# Patient Record
Sex: FEMALE | Race: WHITE | Employment: OTHER | URBAN - METROPOLITAN AREA
[De-identification: names, ages, dates, MRNs, and addresses within clinical notes are randomized per-mention and may not be internally consistent; named-entity substitution may affect disease eponyms.]

---

## 2018-12-25 ENCOUNTER — APPOINTMENT (OUTPATIENT)
Dept: GENERAL RADIOLOGY | Age: 63
End: 2018-12-25

## 2018-12-25 VITALS
WEIGHT: 194 LBS | RESPIRATION RATE: 16 BRPM | SYSTOLIC BLOOD PRESSURE: 162 MMHG | DIASTOLIC BLOOD PRESSURE: 69 MMHG | HEIGHT: 65 IN | BODY MASS INDEX: 32.32 KG/M2 | TEMPERATURE: 99.9 F | HEART RATE: 101 BPM | OXYGEN SATURATION: 94 %

## 2018-12-25 PROCEDURE — 71046 X-RAY EXAM CHEST 2 VIEWS: CPT

## 2018-12-26 ENCOUNTER — HOSPITAL ENCOUNTER (EMERGENCY)
Age: 63
Discharge: HOME | End: 2018-12-26

## 2018-12-26 DIAGNOSIS — J18.9 PNEUMONIA DUE TO ORGANISM: Primary | ICD-10-CM

## 2018-12-26 LAB
ALBUMIN SERPL-MCNC: 3.6 GM/DL (ref 3.4–5)
ALP BLD-CCNC: 81 IU/L (ref 40–129)
ALT SERPL-CCNC: 35 U/L (ref 10–40)
ANION GAP SERPL CALCULATED.3IONS-SCNC: 17 MMOL/L (ref 4–16)
AST SERPL-CCNC: 30 IU/L (ref 15–37)
BASOPHILS ABSOLUTE: 0 K/CU MM
BASOPHILS RELATIVE PERCENT: 0.3 % (ref 0–1)
BILIRUB SERPL-MCNC: 0.2 MG/DL (ref 0–1)
BUN BLDV-MCNC: 13 MG/DL (ref 6–23)
CALCIUM SERPL-MCNC: 8.9 MG/DL (ref 8.3–10.6)
CHLORIDE BLD-SCNC: 97 MMOL/L (ref 99–110)
CO2: 23 MMOL/L (ref 21–32)
CREAT SERPL-MCNC: 0.7 MG/DL (ref 0.6–1.1)
DIFFERENTIAL TYPE: ABNORMAL
EOSINOPHILS ABSOLUTE: 0.1 K/CU MM
EOSINOPHILS RELATIVE PERCENT: 1.3 % (ref 0–3)
GFR AFRICAN AMERICAN: >60 ML/MIN/1.73M2
GFR NON-AFRICAN AMERICAN: >60 ML/MIN/1.73M2
GLUCOSE BLD-MCNC: 137 MG/DL (ref 70–99)
HCT VFR BLD CALC: 37.1 % (ref 37–47)
HEMOGLOBIN: 11.4 GM/DL (ref 12.5–16)
IMMATURE NEUTROPHIL %: 0.5 % (ref 0–0.43)
LYMPHOCYTES ABSOLUTE: 1.1 K/CU MM
LYMPHOCYTES RELATIVE PERCENT: 12.1 % (ref 24–44)
MCH RBC QN AUTO: 27.5 PG (ref 27–31)
MCHC RBC AUTO-ENTMCNC: 30.7 % (ref 32–36)
MCV RBC AUTO: 89.6 FL (ref 78–100)
MONOCYTES ABSOLUTE: 0.9 K/CU MM
MONOCYTES RELATIVE PERCENT: 9.9 % (ref 0–4)
NUCLEATED RBC %: 0 %
PDW BLD-RTO: 12.8 % (ref 11.7–14.9)
PLATELET # BLD: 346 K/CU MM (ref 140–440)
PMV BLD AUTO: 10.1 FL (ref 7.5–11.1)
POTASSIUM SERPL-SCNC: 3.9 MMOL/L (ref 3.5–5.1)
RAPID INFLUENZA  B AGN: NEGATIVE
RAPID INFLUENZA A AGN: NEGATIVE
RBC # BLD: 4.14 M/CU MM (ref 4.2–5.4)
SEGMENTED NEUTROPHILS ABSOLUTE COUNT: 7 K/CU MM
SEGMENTED NEUTROPHILS RELATIVE PERCENT: 75.9 % (ref 36–66)
SODIUM BLD-SCNC: 137 MMOL/L (ref 135–145)
TOTAL IMMATURE NEUTOROPHIL: 0.05 K/CU MM
TOTAL NUCLEATED RBC: 0 K/CU MM
TOTAL PROTEIN: 7.5 GM/DL (ref 6.4–8.2)
WBC # BLD: 9.3 K/CU MM (ref 4–10.5)

## 2018-12-26 PROCEDURE — 80053 COMPREHEN METABOLIC PANEL: CPT

## 2018-12-26 PROCEDURE — 85025 COMPLETE CBC W/AUTO DIFF WBC: CPT

## 2018-12-26 PROCEDURE — 99283 EMERGENCY DEPT VISIT LOW MDM: CPT

## 2018-12-26 PROCEDURE — 6370000000 HC RX 637 (ALT 250 FOR IP): Performed by: PHYSICIAN ASSISTANT

## 2018-12-26 PROCEDURE — 87804 INFLUENZA ASSAY W/OPTIC: CPT

## 2018-12-26 PROCEDURE — 36415 COLL VENOUS BLD VENIPUNCTURE: CPT

## 2018-12-26 PROCEDURE — 6360000002 HC RX W HCPCS: Performed by: PHYSICIAN ASSISTANT

## 2018-12-26 PROCEDURE — 96372 THER/PROPH/DIAG INJ SC/IM: CPT

## 2018-12-26 RX ORDER — AZITHROMYCIN 250 MG/1
500 TABLET, FILM COATED ORAL ONCE
Status: COMPLETED | OUTPATIENT
Start: 2018-12-26 | End: 2018-12-26

## 2018-12-26 RX ORDER — AZITHROMYCIN 250 MG/1
250 TABLET, FILM COATED ORAL DAILY
Qty: 4 TABLET | Refills: 0 | Status: SHIPPED | OUTPATIENT
Start: 2018-12-26 | End: 2018-12-30

## 2018-12-26 RX ORDER — BENZONATATE 100 MG/1
100 CAPSULE ORAL ONCE
Status: COMPLETED | OUTPATIENT
Start: 2018-12-26 | End: 2018-12-26

## 2018-12-26 RX ORDER — CODEINE PHOSPHATE AND GUAIFENESIN 10; 100 MG/5ML; MG/5ML
5 SOLUTION ORAL EVERY 4 HOURS PRN
Status: DISCONTINUED | OUTPATIENT
Start: 2018-12-26 | End: 2018-12-26 | Stop reason: HOSPADM

## 2018-12-26 RX ORDER — GUAIFENESIN AND CODEINE PHOSPHATE 100; 10 MG/5ML; MG/5ML
10 SOLUTION ORAL 3 TIMES DAILY PRN
Qty: 100 ML | Refills: 0 | Status: SHIPPED | OUTPATIENT
Start: 2018-12-26 | End: 2019-01-02

## 2018-12-26 RX ORDER — BENZONATATE 100 MG/1
100 CAPSULE ORAL 3 TIMES DAILY PRN
Qty: 10 CAPSULE | Refills: 0 | Status: SHIPPED | OUTPATIENT
Start: 2018-12-26 | End: 2019-01-02

## 2018-12-26 RX ORDER — DEXAMETHASONE SODIUM PHOSPHATE 4 MG/ML
10 INJECTION, SOLUTION INTRA-ARTICULAR; INTRALESIONAL; INTRAMUSCULAR; INTRAVENOUS; SOFT TISSUE ONCE
Status: COMPLETED | OUTPATIENT
Start: 2018-12-26 | End: 2018-12-26

## 2018-12-26 RX ADMIN — AZITHROMYCIN 500 MG: 250 TABLET, FILM COATED ORAL at 02:03

## 2018-12-26 RX ADMIN — BENZONATATE 100 MG: 100 CAPSULE ORAL at 02:03

## 2018-12-26 RX ADMIN — DEXAMETHASONE SODIUM PHOSPHATE 10 MG: 4 INJECTION, SOLUTION INTRAMUSCULAR; INTRAVENOUS at 02:04

## 2018-12-26 RX ADMIN — GUAIFENESIN AND CODEINE PHOSPHATE 5 ML: 10; 100 LIQUID ORAL at 02:03

## 2018-12-26 NOTE — ED PROVIDER NOTES
Pt is comfortable upon disposition home. Patient is stable, Patients vital signs are stable. Vital signs and nursing notes reviewed during ED course. I independently saw patient today in the ED. All pertinent Lab data and radiographic results reviewed with patient at bedside. The patient and/or the family were informed of the results of any tests/labs/imaging, the treatment plan, and time was allotted to answer questions. See chart for details of medications given during the ED stay. Clinical Impression:  1. Pneumonia due to organism      (Please note that portions of this note may have been completed with a voice recognition program. Efforts were made to edit the dictations but occasionally words are mis-transcribed.)    Daniel Hernandez PA-C   Comment: Please note this report has been produced using speech recognition software and may contain errors related to that system including errors in grammar, punctuation, and spelling, as well as words and phrases that may be inappropriate. If there are any questions or concerns please feel free to contact the dictating provider for clarification.         Daniel Hernandez PA-C  12/26/18 [id number]

## 2020-06-11 ENCOUNTER — OFFICE VISIT (OUTPATIENT)
Dept: PRIMARY CARE CLINIC | Age: 65
End: 2020-06-11
Payer: MEDICARE

## 2020-06-11 ENCOUNTER — HOSPITAL ENCOUNTER (OUTPATIENT)
Age: 65
Setting detail: SPECIMEN
Discharge: HOME | End: 2020-06-11
Payer: MEDICARE

## 2020-06-11 VITALS — TEMPERATURE: 99.1 F

## 2020-06-11 PROCEDURE — 4040F PNEUMOC VAC/ADMIN/RCVD: CPT | Performed by: FAMILY MEDICINE

## 2020-06-11 PROCEDURE — 1036F TOBACCO NON-USER: CPT | Performed by: FAMILY MEDICINE

## 2020-06-11 PROCEDURE — 3017F COLORECTAL CA SCREEN DOC REV: CPT | Performed by: FAMILY MEDICINE

## 2020-06-11 PROCEDURE — G8421 BMI NOT CALCULATED: HCPCS | Performed by: FAMILY MEDICINE

## 2020-06-11 PROCEDURE — G8400 PT W/DXA NO RESULTS DOC: HCPCS | Performed by: FAMILY MEDICINE

## 2020-06-11 PROCEDURE — 1123F ACP DISCUSS/DSCN MKR DOCD: CPT | Performed by: FAMILY MEDICINE

## 2020-06-11 PROCEDURE — 99212 OFFICE O/P EST SF 10 MIN: CPT | Performed by: FAMILY MEDICINE

## 2020-06-11 PROCEDURE — 1090F PRES/ABSN URINE INCON ASSESS: CPT | Performed by: FAMILY MEDICINE

## 2020-06-11 PROCEDURE — G8427 DOCREV CUR MEDS BY ELIG CLIN: HCPCS | Performed by: FAMILY MEDICINE

## 2020-06-11 PROCEDURE — U0002 COVID-19 LAB TEST NON-CDC: HCPCS

## 2020-06-14 LAB
SARS-COV-2: NOT DETECTED
SOURCE: NORMAL

## 2021-01-18 ENCOUNTER — TELEPHONE (OUTPATIENT)
Dept: CARDIOLOGY CLINIC | Age: 66
End: 2021-01-18

## 2021-01-18 ENCOUNTER — OFFICE VISIT (OUTPATIENT)
Dept: FAMILY MEDICINE CLINIC | Age: 66
End: 2021-01-18
Payer: COMMERCIAL

## 2021-01-18 VITALS
RESPIRATION RATE: 16 BRPM | WEIGHT: 205 LBS | HEART RATE: 91 BPM | BODY MASS INDEX: 34.16 KG/M2 | SYSTOLIC BLOOD PRESSURE: 112 MMHG | HEIGHT: 65 IN | DIASTOLIC BLOOD PRESSURE: 70 MMHG | TEMPERATURE: 96.4 F | OXYGEN SATURATION: 96 %

## 2021-01-18 DIAGNOSIS — R06.02 SHORTNESS OF BREATH: ICD-10-CM

## 2021-01-18 DIAGNOSIS — R53.83 OTHER FATIGUE: ICD-10-CM

## 2021-01-18 DIAGNOSIS — M62.838 MUSCLE SPASM: ICD-10-CM

## 2021-01-18 DIAGNOSIS — R94.31 ABNORMAL ECG: ICD-10-CM

## 2021-01-18 DIAGNOSIS — J45.20 MILD INTERMITTENT ASTHMA WITHOUT COMPLICATION: ICD-10-CM

## 2021-01-18 DIAGNOSIS — R73.09 ELEVATED GLUCOSE: Primary | ICD-10-CM

## 2021-01-18 DIAGNOSIS — Z13.220 SCREENING, LIPID: ICD-10-CM

## 2021-01-18 DIAGNOSIS — L98.9 FACIAL LESION: ICD-10-CM

## 2021-01-18 DIAGNOSIS — R06.02 SHORTNESS OF BREATH: Primary | ICD-10-CM

## 2021-01-18 LAB
A/G RATIO: 1.8 (ref 1.1–2.2)
ALBUMIN SERPL-MCNC: 4.3 G/DL (ref 3.4–5)
ALP BLD-CCNC: 76 U/L (ref 40–129)
ALT SERPL-CCNC: 22 U/L (ref 10–40)
ANION GAP SERPL CALCULATED.3IONS-SCNC: 11 MMOL/L (ref 3–16)
AST SERPL-CCNC: 17 U/L (ref 15–37)
BASOPHILS ABSOLUTE: 0 K/UL (ref 0–0.2)
BASOPHILS RELATIVE PERCENT: 0.7 %
BILIRUB SERPL-MCNC: <0.2 MG/DL (ref 0–1)
BUN BLDV-MCNC: 13 MG/DL (ref 7–20)
CALCIUM SERPL-MCNC: 9.3 MG/DL (ref 8.3–10.6)
CHLORIDE BLD-SCNC: 100 MMOL/L (ref 99–110)
CO2: 26 MMOL/L (ref 21–32)
CREAT SERPL-MCNC: 0.7 MG/DL (ref 0.6–1.2)
EOSINOPHILS ABSOLUTE: 0.1 K/UL (ref 0–0.6)
EOSINOPHILS RELATIVE PERCENT: 1.9 %
GFR AFRICAN AMERICAN: >60
GFR NON-AFRICAN AMERICAN: >60
GLOBULIN: 2.4 G/DL
GLUCOSE BLD-MCNC: 143 MG/DL (ref 70–99)
HCT VFR BLD CALC: 37.3 % (ref 36–48)
HEMOGLOBIN: 12.2 G/DL (ref 12–16)
LYMPHOCYTES ABSOLUTE: 1.5 K/UL (ref 1–5.1)
LYMPHOCYTES RELATIVE PERCENT: 27.1 %
MAGNESIUM: 2 MG/DL (ref 1.8–2.4)
MCH RBC QN AUTO: 27.9 PG (ref 26–34)
MCHC RBC AUTO-ENTMCNC: 32.8 G/DL (ref 31–36)
MCV RBC AUTO: 85 FL (ref 80–100)
MONOCYTES ABSOLUTE: 0.5 K/UL (ref 0–1.3)
MONOCYTES RELATIVE PERCENT: 8.4 %
NEUTROPHILS ABSOLUTE: 3.4 K/UL (ref 1.7–7.7)
NEUTROPHILS RELATIVE PERCENT: 61.9 %
PDW BLD-RTO: 14.4 % (ref 12.4–15.4)
PLATELET # BLD: 255 K/UL (ref 135–450)
PMV BLD AUTO: 9.3 FL (ref 5–10.5)
POTASSIUM SERPL-SCNC: 4.4 MMOL/L (ref 3.5–5.1)
RBC # BLD: 4.38 M/UL (ref 4–5.2)
SODIUM BLD-SCNC: 137 MMOL/L (ref 136–145)
TOTAL PROTEIN: 6.7 G/DL (ref 6.4–8.2)
TSH REFLEX FT4: 3.24 UIU/ML (ref 0.27–4.2)
VITAMIN B-12: 361 PG/ML (ref 211–911)
WBC # BLD: 5.5 K/UL (ref 4–11)

## 2021-01-18 PROCEDURE — 4040F PNEUMOC VAC/ADMIN/RCVD: CPT | Performed by: PHYSICIAN ASSISTANT

## 2021-01-18 PROCEDURE — 3017F COLORECTAL CA SCREEN DOC REV: CPT | Performed by: PHYSICIAN ASSISTANT

## 2021-01-18 PROCEDURE — 3288F FALL RISK ASSESSMENT DOCD: CPT | Performed by: PHYSICIAN ASSISTANT

## 2021-01-18 PROCEDURE — G8427 DOCREV CUR MEDS BY ELIG CLIN: HCPCS | Performed by: PHYSICIAN ASSISTANT

## 2021-01-18 PROCEDURE — G8484 FLU IMMUNIZE NO ADMIN: HCPCS | Performed by: PHYSICIAN ASSISTANT

## 2021-01-18 PROCEDURE — 99203 OFFICE O/P NEW LOW 30 MIN: CPT | Performed by: PHYSICIAN ASSISTANT

## 2021-01-18 PROCEDURE — G8417 CALC BMI ABV UP PARAM F/U: HCPCS | Performed by: PHYSICIAN ASSISTANT

## 2021-01-18 PROCEDURE — 1036F TOBACCO NON-USER: CPT | Performed by: PHYSICIAN ASSISTANT

## 2021-01-18 PROCEDURE — 1090F PRES/ABSN URINE INCON ASSESS: CPT | Performed by: PHYSICIAN ASSISTANT

## 2021-01-18 PROCEDURE — G8400 PT W/DXA NO RESULTS DOC: HCPCS | Performed by: PHYSICIAN ASSISTANT

## 2021-01-18 PROCEDURE — 93000 ELECTROCARDIOGRAM COMPLETE: CPT | Performed by: PHYSICIAN ASSISTANT

## 2021-01-18 PROCEDURE — G8510 SCR DEP NEG, NO PLAN REQD: HCPCS | Performed by: PHYSICIAN ASSISTANT

## 2021-01-18 PROCEDURE — 1123F ACP DISCUSS/DSCN MKR DOCD: CPT | Performed by: PHYSICIAN ASSISTANT

## 2021-01-18 RX ORDER — ALBUTEROL SULFATE 90 UG/1
2 AEROSOL, METERED RESPIRATORY (INHALATION) EVERY 6 HOURS PRN
COMMUNITY
End: 2021-10-21 | Stop reason: SDUPTHER

## 2021-01-18 ASSESSMENT — ENCOUNTER SYMPTOMS
DIARRHEA: 0
EYES NEGATIVE: 1
VOMITING: 0
ABDOMINAL PAIN: 0
SHORTNESS OF BREATH: 1
CHEST TIGHTNESS: 0
WHEEZING: 0
COUGH: 0
NAUSEA: 0

## 2021-01-18 ASSESSMENT — PATIENT HEALTH QUESTIONNAIRE - PHQ9
SUM OF ALL RESPONSES TO PHQ QUESTIONS 1-9: 0
1. LITTLE INTEREST OR PLEASURE IN DOING THINGS: 0
SUM OF ALL RESPONSES TO PHQ QUESTIONS 1-9: 0
SUM OF ALL RESPONSES TO PHQ QUESTIONS 1-9: 0

## 2021-01-18 NOTE — TELEPHONE ENCOUNTER
Called patient to schedule Consult for SOB and abnormal EKG Offered [place] appt with Dr. Laura Lane due to address. Patient preferred to come to [facility] [place] with Dr. Merry Lubin (her  sees him). Patient scheduled for 1/27/2021 Patient voiced agreement.

## 2021-01-18 NOTE — PROGRESS NOTES
to Visit   Medication Sig Dispense Refill    albuterol sulfate  (90 Base) MCG/ACT inhaler Inhale 2 puffs into the lungs every 6 hours as needed       No current facility-administered medications on file prior to visit. Review of PMH, PSH, Family Hx, Allergies and updates made as needed. PHQ Scores 1/18/2021   PHQ2 Score 0   PHQ9 Score 0     Interpretation of Total Score Depression Severity: 1-4 = Minimal depression, 5-9 = Mild depression, 10-14 = Moderate depression, 15-19 = Moderately severe depression, 20-27 = Severe depression      No Known Allergies    History reviewed. No pertinent past medical history.     Past Surgical History:   Procedure Laterality Date    CHOLECYSTECTOMY      HYSTERECTOMY         Social History     Socioeconomic History    Marital status:      Spouse name: None    Number of children: None    Years of education: None    Highest education level: None   Occupational History    None   Social Needs    Financial resource strain: None    Food insecurity     Worry: None     Inability: None    Transportation needs     Medical: None     Non-medical: None   Tobacco Use    Smoking status: Never Smoker    Smokeless tobacco: Never Used   Substance and Sexual Activity    Alcohol use: None    Drug use: None    Sexual activity: None   Lifestyle    Physical activity     Days per week: None     Minutes per session: None    Stress: None   Relationships    Social connections     Talks on phone: None     Gets together: None     Attends Yazdanism service: None     Active member of club or organization: None     Attends meetings of clubs or organizations: None     Relationship status: None    Intimate partner violence     Fear of current or ex partner: None     Emotionally abused: None     Physically abused: None     Forced sexual activity: None   Other Topics Concern    None   Social History Narrative    None       Review of Systems   Constitutional: Negative for chills, diaphoresis, fever and unexpected weight change. HENT: Negative. Eyes: Negative. Respiratory: Positive for shortness of breath. Negative for cough, chest tightness and wheezing. Cardiovascular: Negative for chest pain, palpitations and leg swelling. Gastrointestinal: Negative for abdominal pain, diarrhea, nausea and vomiting. Endocrine: Negative for cold intolerance and heat intolerance. Genitourinary: Negative. Musculoskeletal: Positive for myalgias. Skin: Negative for rash. Lesion above right upper lip   Neurological: Negative for dizziness, light-headedness and headaches. Hematological: Negative for adenopathy. Psychiatric/Behavioral: Negative for hallucinations. The patient is not nervous/anxious. OBJECTIVE:    /70 (Site: Right Upper Arm, Position: Sitting, Cuff Size: Medium Adult)   Pulse 91   Temp 96.4 °F (35.8 °C) (Temporal)   Resp 16   Ht 5' 5\" (1.651 m)   Wt 205 lb (93 kg)   SpO2 96%   BMI 34.11 kg/m²     Physical Exam  Vitals signs reviewed. Constitutional:       Appearance: Normal appearance. HENT:      Head: Normocephalic. Right Ear: External ear normal.      Left Ear: External ear normal.      Mouth/Throat:      Lips: Lesions present. Comments: Firm slightly raised lesion right upper lip  Eyes:      Extraocular Movements: Extraocular movements intact. Neck:      Musculoskeletal: Normal range of motion and neck supple. No muscular tenderness. Thyroid: No thyroid mass, thyromegaly or thyroid tenderness. Cardiovascular:      Rate and Rhythm: Regular rhythm. Tachycardia present. Pulses: Normal pulses. Heart sounds: Normal heart sounds. Pulmonary:      Breath sounds: Normal breath sounds. Abdominal:      General: Bowel sounds are normal.      Palpations: Abdomen is soft. Musculoskeletal: Normal range of motion. Lymphadenopathy:      Cervical: No cervical adenopathy.    Skin:     General: Skin is warm and dry.   Neurological:      Mental Status: She is alert and oriented to person, place, and time. Psychiatric:         Mood and Affect: Mood normal.         ASSESSMENT/PLAN:    Problem List        Respiratory    Mild intermittent asthma without complication     Stable, f/u if increased wheezing/sob         Relevant Medications    albuterol sulfate  (90 Base) MCG/ACT inhaler       Other    Shortness of breath - Primary     Will refer to cardiology as requested by pt         Relevant Orders    EKG 12 lead (Completed)    Comprehensive Metabolic Panel    Trino Sheets MD, Cardiology, [facility]    Screening, lipid    Relevant Orders    Lipid, Fasting    Other fatigue     Labs todat         Relevant Orders    Comprehensive Metabolic Panel    CBC WITH AUTO DIFFERENTIAL    TSH WITH REFLEX TO FT4    VITAMIN D 25 HYDROXY    VITAMIN B12    Muscle spasm     Labs today         Relevant Orders    MAGNESIUM    Facial lesion     Referral to derm-Dr. Quita Anders         Relevant Orders    Amb External Referral To Dermatology    Abnormal ECG     Will refer to cardiology per pt request. As last cardiac workup was with Dr. Misha Simon and we will be unable to obtain records, referral placed today  Ekg has some minor abnormalities, no acute findings. Relevant Orders    Trino Sheets MD, Cardiology, [facility]               Return in about 4 months (around 5/18/2021) for medicare wellness.

## 2021-01-18 NOTE — ASSESSMENT & PLAN NOTE
Will refer to cardiology per pt request. As last cardiac workup was with Dr. Noah Soliz and we will be unable to obtain records, referral placed today  Ekg has some minor abnormalities, no acute findings.

## 2021-01-19 DIAGNOSIS — R73.09 ELEVATED GLUCOSE: ICD-10-CM

## 2021-01-19 LAB
ESTIMATED AVERAGE GLUCOSE: 134.1 MG/DL
HBA1C MFR BLD: 6.3 %

## 2021-01-20 ENCOUNTER — HOSPITAL ENCOUNTER (OUTPATIENT)
Age: 66
Discharge: HOME | End: 2021-01-20
Payer: MEDICARE

## 2021-01-20 LAB
CHOLESTEROL: 211 MG/DL
HDLC SERPL-MCNC: 45 MG/DL
LDL CHOLESTEROL DIRECT: 139 MG/DL
TRIGL SERPL-MCNC: 132 MG/DL
VITAMIN D 25-HYDROXY: 19.2 NG/ML

## 2021-01-20 PROCEDURE — 36415 COLL VENOUS BLD VENIPUNCTURE: CPT

## 2021-01-20 PROCEDURE — 80061 LIPID PANEL: CPT

## 2021-01-20 PROCEDURE — 83721 ASSAY OF BLOOD LIPOPROTEIN: CPT

## 2021-01-27 ENCOUNTER — PROCEDURE VISIT (OUTPATIENT)
Dept: CARDIOLOGY CLINIC | Age: 66
End: 2021-01-27
Payer: MEDICARE

## 2021-01-27 ENCOUNTER — INITIAL CONSULT (OUTPATIENT)
Dept: CARDIOLOGY CLINIC | Age: 66
End: 2021-01-27
Payer: MEDICARE

## 2021-01-27 ENCOUNTER — TELEPHONE (OUTPATIENT)
Dept: CARDIOLOGY CLINIC | Age: 66
End: 2021-01-27

## 2021-01-27 ENCOUNTER — NURSE ONLY (OUTPATIENT)
Dept: CARDIOLOGY CLINIC | Age: 66
End: 2021-01-27
Payer: MEDICARE

## 2021-01-27 VITALS
BODY MASS INDEX: 33.82 KG/M2 | HEART RATE: 76 BPM | WEIGHT: 203 LBS | HEIGHT: 65 IN | SYSTOLIC BLOOD PRESSURE: 114 MMHG | DIASTOLIC BLOOD PRESSURE: 70 MMHG

## 2021-01-27 DIAGNOSIS — R00.2 PALPITATION: ICD-10-CM

## 2021-01-27 DIAGNOSIS — E66.09 CLASS 2 OBESITY DUE TO EXCESS CALORIES WITHOUT SERIOUS COMORBIDITY WITH BODY MASS INDEX (BMI) OF 35.0 TO 35.9 IN ADULT: ICD-10-CM

## 2021-01-27 DIAGNOSIS — R06.02 SHORTNESS OF BREATH: ICD-10-CM

## 2021-01-27 DIAGNOSIS — R00.2 PALPITATIONS: Primary | ICD-10-CM

## 2021-01-27 DIAGNOSIS — R94.31 ABNORMAL ECG: ICD-10-CM

## 2021-01-27 DIAGNOSIS — R06.02 SHORTNESS OF BREATH: Primary | ICD-10-CM

## 2021-01-27 DIAGNOSIS — R00.2 PALPITATIONS: ICD-10-CM

## 2021-01-27 PROBLEM — E66.812 CLASS 2 OBESITY DUE TO EXCESS CALORIES IN ADULT: Status: ACTIVE | Noted: 2021-01-27

## 2021-01-27 LAB
LV EF: 58 %
LVEF MODALITY: NORMAL

## 2021-01-27 PROCEDURE — G8417 CALC BMI ABV UP PARAM F/U: HCPCS | Performed by: INTERNAL MEDICINE

## 2021-01-27 PROCEDURE — 93306 TTE W/DOPPLER COMPLETE: CPT | Performed by: INTERNAL MEDICINE

## 2021-01-27 PROCEDURE — 4040F PNEUMOC VAC/ADMIN/RCVD: CPT | Performed by: INTERNAL MEDICINE

## 2021-01-27 PROCEDURE — 1090F PRES/ABSN URINE INCON ASSESS: CPT | Performed by: INTERNAL MEDICINE

## 2021-01-27 PROCEDURE — 99204 OFFICE O/P NEW MOD 45 MIN: CPT | Performed by: INTERNAL MEDICINE

## 2021-01-27 PROCEDURE — 3017F COLORECTAL CA SCREEN DOC REV: CPT | Performed by: INTERNAL MEDICINE

## 2021-01-27 PROCEDURE — G8484 FLU IMMUNIZE NO ADMIN: HCPCS | Performed by: INTERNAL MEDICINE

## 2021-01-27 PROCEDURE — G8400 PT W/DXA NO RESULTS DOC: HCPCS | Performed by: INTERNAL MEDICINE

## 2021-01-27 PROCEDURE — 1123F ACP DISCUSS/DSCN MKR DOCD: CPT | Performed by: INTERNAL MEDICINE

## 2021-01-27 PROCEDURE — 1036F TOBACCO NON-USER: CPT | Performed by: INTERNAL MEDICINE

## 2021-01-27 PROCEDURE — G8427 DOCREV CUR MEDS BY ELIG CLIN: HCPCS | Performed by: INTERNAL MEDICINE

## 2021-01-27 PROCEDURE — 93000 ELECTROCARDIOGRAM COMPLETE: CPT | Performed by: INTERNAL MEDICINE

## 2021-01-27 PROCEDURE — 93015 CV STRESS TEST SUPVJ I&R: CPT | Performed by: INTERNAL MEDICINE

## 2021-01-27 NOTE — PROGRESS NOTES
72 hour holter monitor [email or web address] for palpitations and sob Serial # [id number] . Instructed patient on monitor and proper use. Instructed on diary. When to remove and bring it back. Must leave the holter monitor on  without removing for the duration of time ordered. Answered all questions the patient had. In[facility]ed [phone] with any questions or concerns with the monitor.

## 2021-01-27 NOTE — PROGRESS NOTES
CARDIAC CONSULT NOTE       Miri  72 y.o.  female    Chief Complaint   Patient presents with    Shortness of Breath    Palpitations       Referring physician:  Savannah Rubin PA-C     Primary care physician:  Savannah Rubin PA-C    History of Present Illness:     Juan Macias is a 72 y.o. female referred for evaluation and management of SOB & Palpitations. Shortness of Breath  1. When did it start for about 7 or 8 months  2. Severity NYHA CLASS 2  3. Orthopnea? No  4. PND: no  5. Associated features palpitations, which occur during activity. No C/O CHEST PAIN. No significant cardiac risk factors. has no past medical history on file. has a past surgical history that includes Cholecystectomy and Hysterectomy. reports that she has never smoked. She has never used smokeless tobacco. She reports previous alcohol use. She reports that she does not use drugs. family history includes Heart Surgery in her father and mother; Hypertension in her mother. Review of Systems:   1. Cardiovascular: No chest pain, dyspnea on exertion, palpitations or loss of consciousness  2. Respiratory: No cough or wheezing    3. Musculoskeletal:  No gait disturbance, weakness, muscle cramps, aches & pains or joint complaints  4. Neurological: No TIA or stroke symptoms  5. Psychiatric: No anxiety or depression  6. Hematologic/Lymphatic: No bleeding problems, blood clots or swollen lymph nodes    Physical Examination:    /70   Pulse 76   Ht 5' 4.5\" (1.638 m)   Wt 203 lb (92.1 kg)   BMI 34.31 kg/m²    Wt Readings from Last 3 Encounters:   01/27/21 203 lb (92.1 kg)   01/18/21 205 lb (93 kg)   12/25/18 194 lb (88 kg)     Body mass index is 34.31 kg/m². General Appearance:  Non-obese/Well Nourished  1. Skin: It is warm & dry. No rashes noted. 2. Eyes: No conjunctival Pallor seen. No jaundice noted. 3. Neck: is supple there is no elevation of JVD. No thyromegaly  4. Respiratory:  · Resp Assessment: No abnormal findings. · Resp Auscultation: Vesicular breath sounds without rales or wheezing. 5. Cardiovascular:  · Auscultation: Normal S1 & S2, No prominent murmurs  · Carotid Arteries: No bruits present  · Abdominal Aorta: Non-palpable  · Pedal Pulses: 2+ and equal   6. Abdomen:  · No masses or tenderness  · Liver/Spleen: No Abnormalities Noted, no organomegaly. 7. Musculoskeletal: No joint deformities. No muscle wasting  8. Extremities:  ·  No Cyanosis or Clubbing. No significant edema   9. Rectal / genital:  ·  Deferred  10.  Neurological/Psychiatric:  · Oriented to time, place, and person  · Non-anxious    No results found for: CKTOTAL, CKMB, CKMBINDEX, TROPONINT  BNP:  No results found for: BNP, PROBNP  PT/INR:  No results found for: PTINR  Lab Results   Component Value Date    LABA1C 6.3 01/18/2021     Lab Results   Component Value Date    CHOL 211 (H) 01/20/2021    CHOL 178 03/30/2012    TRIG 132 01/20/2021    TRIG 152 (H) 03/30/2012    HDL 45 01/20/2021    HDL 48 (L) 03/30/2012    LDLDIRECT 139 (H) 01/20/2021    LDLDIRECT 106 (H) 03/30/2012     Lab Results   Component Value Date    ALT 22 01/18/2021    ALT 35 12/26/2018    AST 17 01/18/2021    AST 30 12/26/2018     BMP:    Lab Results   Component Value Date     01/18/2021     12/26/2018    K 4.4 01/18/2021    K 3.9 12/26/2018     01/18/2021    CL 97 12/26/2018    CO2 26 01/18/2021    CO2 23 12/26/2018    BUN 13 01/18/2021    BUN 13 12/26/2018    CREATININE 0.7 01/18/2021    CREATININE 0.7 12/26/2018     CMP:    Lab Results   Component Value Date     01/18/2021     12/26/2018    K 4.4 01/18/2021    K 3.9 12/26/2018     01/18/2021    CL 97 12/26/2018    CO2 26 01/18/2021    CO2 23 12/26/2018    BUN 13 01/18/2021    BUN 13 12/26/2018    CREATININE 0.7 01/18/2021    CREATININE 0.7 12/26/2018    PROT 6.7 01/18/2021    PROT 7.5 12/26/2018    PROT 6.8 03/30/2012     TSH:    Lab Results Component Value Date    [facility] 2.871 03/30/2012       Echo: not done  Stress Cardiolyte: not done  EKG: normal sinus rhythm     QUALITY MEASURES REVIEWED:  1.CAD:Patient is taking anti platelet agent:No  Patient does not have Hx of documented CAD  2. DYSLIPIDEMIA: Patient is on cholesterol lowering medication:No   3. Beta-Blocker therapy for CAD, if prior Myocardial Infarction:No   4. Counselled regarding smoking cessation. No   Patient does not Smoke. 5.Anticoagulation therapy (for A.Fib) No   Does Not have A.Fib.   6.Discussed weight management strategies. Assessment, Recommendations & Tests:     Patient with worsening SOB & C/O Palpitations with exertion. Symptoms are fairly new. No significant Cardiac risk factors. Will check Echo, ETT & EVENT MONITOR. Office Visit for test results.      Jose Vaughan MD, 1/27/2021 8:39 AM

## 2021-02-02 ENCOUNTER — TELEPHONE (OUTPATIENT)
Dept: CARDIOLOGY CLINIC | Age: 66
End: 2021-02-02

## 2021-02-02 NOTE — TELEPHONE ENCOUNTER
LM on VM of normal results. Patient to call with any questions. Limited study due to patients body habitus. Left ventricular function is normal, EF is estimated at 55-60%. E/A reversal; indeterminate diastolic function. No regional wall motion abnormalities were detected. No significant valvular abnormalities. RVSP is 10 mmHg. No evidence of pericardial effusion. Fair exercise tolerance. Little over 5 METs work load. Baseline Hypertension with Hypertensive BP response to exercise. ETT negative for Ischemia / Arrhythmia.

## 2021-02-04 PROCEDURE — 93272 ECG/REVIEW INTERPRET ONLY: CPT | Performed by: INTERNAL MEDICINE

## 2021-02-08 ENCOUNTER — TELEPHONE (OUTPATIENT)
Dept: FAMILY MEDICINE CLINIC | Age: 66
End: 2021-02-08

## 2021-02-08 ENCOUNTER — OFFICE VISIT (OUTPATIENT)
Dept: CARDIOLOGY CLINIC | Age: 66
End: 2021-02-08
Payer: MEDICARE

## 2021-02-08 VITALS
DIASTOLIC BLOOD PRESSURE: 82 MMHG | WEIGHT: 203.8 LBS | HEART RATE: 70 BPM | BODY MASS INDEX: 34.79 KG/M2 | SYSTOLIC BLOOD PRESSURE: 128 MMHG | HEIGHT: 64 IN

## 2021-02-08 DIAGNOSIS — R94.31 ABNORMAL ECG: ICD-10-CM

## 2021-02-08 DIAGNOSIS — R06.02 SHORTNESS OF BREATH: Primary | ICD-10-CM

## 2021-02-08 DIAGNOSIS — J45.20 MILD INTERMITTENT ASTHMA WITHOUT COMPLICATION: ICD-10-CM

## 2021-02-08 DIAGNOSIS — E66.09 CLASS 2 OBESITY DUE TO EXCESS CALORIES WITHOUT SERIOUS COMORBIDITY WITH BODY MASS INDEX (BMI) OF 35.0 TO 35.9 IN ADULT: ICD-10-CM

## 2021-02-08 DIAGNOSIS — R00.2 PALPITATIONS: ICD-10-CM

## 2021-02-08 PROCEDURE — 1090F PRES/ABSN URINE INCON ASSESS: CPT | Performed by: INTERNAL MEDICINE

## 2021-02-08 PROCEDURE — G8400 PT W/DXA NO RESULTS DOC: HCPCS | Performed by: INTERNAL MEDICINE

## 2021-02-08 PROCEDURE — G8417 CALC BMI ABV UP PARAM F/U: HCPCS | Performed by: INTERNAL MEDICINE

## 2021-02-08 PROCEDURE — 4040F PNEUMOC VAC/ADMIN/RCVD: CPT | Performed by: INTERNAL MEDICINE

## 2021-02-08 PROCEDURE — G8484 FLU IMMUNIZE NO ADMIN: HCPCS | Performed by: INTERNAL MEDICINE

## 2021-02-08 PROCEDURE — 99213 OFFICE O/P EST LOW 20 MIN: CPT | Performed by: INTERNAL MEDICINE

## 2021-02-08 PROCEDURE — 1123F ACP DISCUSS/DSCN MKR DOCD: CPT | Performed by: INTERNAL MEDICINE

## 2021-02-08 PROCEDURE — G8427 DOCREV CUR MEDS BY ELIG CLIN: HCPCS | Performed by: INTERNAL MEDICINE

## 2021-02-08 PROCEDURE — 3017F COLORECTAL CA SCREEN DOC REV: CPT | Performed by: INTERNAL MEDICINE

## 2021-02-08 PROCEDURE — 1036F TOBACCO NON-USER: CPT | Performed by: INTERNAL MEDICINE

## 2021-02-08 NOTE — PATIENT INSTRUCTIONS
Primary / Secondary prevention is the goal by aggressive risk modification, healthy and therapeutic life style changes for cardiovascular risk reduction. Various goals are discussed and multiple questions answered. CAD:No  HTN:No  CARDIOMYOPATHY:No  CONGESTIVE HEART FAILURE:No     VHD:no   No significant VHD noted  OTHER RELEVANT DIAGNOSIS:as noted in patient's active problem list:  Obesity:  TESTS ORDERED:   None this visit  All previously ordered tests reviewed. MEDICATIONS: CPM     Office f/u in one year.

## 2021-02-08 NOTE — LETTER
[facility]  [address] [address]  Phone: [phone]  Fax: [phone]    Bobby Kulkarni MD        February 8, 2021     [place] [id number]    Patient: Jessica Rodgers  MR Number: [id number]  YOB: 1955  Date of Visit: 2/8/2021    Dear Dr. Don Romero: Thank you for the request for consultation for Merrill Rodriguez to me for the evaluation of SOB. Below are the relevant portions of my assessment and plan of care. If you have questions, please do not hesitate to call me. I look forward to following [place] along with you.     Sincerely,        Bobby Kulkarni MD

## 2021-02-08 NOTE — TELEPHONE ENCOUNTER
Patient is calling about [employer] meal replacement shakes. She wanted to be sure it was okay with her restricted diet of low-fat, low-carb foods. Please advise.

## 2021-02-08 NOTE — PROGRESS NOTES
OFFICE PROGRESS NOTES      Clemente Chong is a 72 y.o. female who has    CHIEF COMPLAINT AS FOLLOWS:  CHEST PAIN: Patient denies any C/O chest pains at this time. SOB: Has SOB with exertion but no change over previous noted. LEG EDEMA: No leg edema   PALPITATIONS: Denies any C/O Palpitations   DIZZINESS: No C/O Dizziness   SYNCOPE: None   OTHER:                                     HPI: Patient is here for F/U on her SOB & Palpitations problems. She does not have any new complaints at this time. Current Outpatient Medications   Medication Sig Dispense Refill    VITAMIN D PO Take by mouth daily      Red Yeast Rice Extract (RED YEAST RICE PO) Take by mouth      albuterol sulfate  (90 Base) MCG/ACT inhaler Inhale 2 puffs into the lungs every 6 hours as needed       No current facility-administered medications for this visit. Allergies: Patient has no known allergies. Review of Systems:    Constitutional: Negative for diaphoresis and fatigue  Respiratory: Negative for shortness of breath  Cardiovascular: Negative for chest pain, dyspnea on exertion, claudication, edema, irregular heartbeat, murmur, palpitations or shortness of breath  Musculoskeletal: Negative for muscle pain, muscular weakness, negative for pain in arm and leg or swelling in foot and leg    Objective:  /82   Pulse 70   Ht 5' 4\" (1.626 m)   Wt 203 lb 12.8 oz (92.4 kg)   BMI 34.98 kg/m²   Wt Readings from Last 3 Encounters:   02/08/21 203 lb 12.8 oz (92.4 kg)   01/27/21 203 lb (92.1 kg)   01/18/21 205 lb (93 kg)     Body mass index is 34.98 kg/m². GENERAL - Alert, oriented, pleasant, in no apparent distress. EYES: No jaundice, no conjunctival pallor. Neck - Supple. No jugular venous distention noted. No carotid bruits. Cardiovascular - Normal S1 and S2 without obvious murmur or gallop. Extremities - No cyanosis, clubbing, or significant edema. Pulmonary - No respiratory distress.   No wheezes or rales. Lab Review   No results found for: TROPONINT  No results found for: BNP, PROBNP  No results found for: INR  Lab Results   Component Value Date    LABA1C 6.3 01/18/2021     Lab Results   Component Value Date    WBC 5.5 01/18/2021    WBC 9.3 12/26/2018    HCT 37.3 01/18/2021    HCT 37.1 12/26/2018    MCV 85.0 01/18/2021    MCV 89.6 12/26/2018     01/18/2021     12/26/2018     Lab Results   Component Value Date    CHOL 211 (H) 01/20/2021    CHOL 178 03/30/2012    TRIG 132 01/20/2021    TRIG 152 (H) 03/30/2012    HDL 45 01/20/2021    HDL 48 (L) 03/30/2012    LDLDIRECT 139 (H) 01/20/2021    LDLDIRECT 106 (H) 03/30/2012     Lab Results   Component Value Date    ALT 22 01/18/2021    ALT 35 12/26/2018    AST 17 01/18/2021    AST 30 12/26/2018     BMP:    Lab Results   Component Value Date     01/18/2021     12/26/2018    K 4.4 01/18/2021    K 3.9 12/26/2018     01/18/2021    CL 97 12/26/2018    CO2 26 01/18/2021    CO2 23 12/26/2018    BUN 13 01/18/2021    BUN 13 12/26/2018    CREATININE 0.7 01/18/2021    CREATININE 0.7 12/26/2018     CMP:   Lab Results   Component Value Date     01/18/2021     12/26/2018    K 4.4 01/18/2021    K 3.9 12/26/2018     01/18/2021    CL 97 12/26/2018    CO2 26 01/18/2021    CO2 23 12/26/2018    BUN 13 01/18/2021    BUN 13 12/26/2018    CREATININE 0.7 01/18/2021    CREATININE 0.7 12/26/2018    PROT 6.7 01/18/2021    PROT 7.5 12/26/2018    PROT 6.8 03/30/2012     Lab Results   Component Value Date    [facility] 2.871 03/30/2012     ETT 1/2021:   Fair exercise tolerance. Little over 5 METs work load. Baseline Hypertension with Hypertensive BP response to exercise. ETT negative for Ischemia / Arrhythmia. ECHO 1/2021   Limited study due to patients body habitus. Left ventricular function is normal, EF is estimated at 55-60%. E/A reversal; indeterminate diastolic function.    No regional wall motion abnormalities were

## 2021-02-17 PROBLEM — Z13.220 SCREENING, LIPID: Status: RESOLVED | Noted: 2021-01-18 | Resolved: 2021-02-17

## 2021-03-16 PROBLEM — E55.9 VITAMIN D DEFICIENCY: Status: ACTIVE | Noted: 2021-03-16

## 2021-05-20 ENCOUNTER — OFFICE VISIT (OUTPATIENT)
Dept: FAMILY MEDICINE CLINIC | Age: 66
End: 2021-05-20
Payer: MEDICARE

## 2021-05-20 VITALS
HEART RATE: 77 BPM | SYSTOLIC BLOOD PRESSURE: 120 MMHG | WEIGHT: 198.8 LBS | BODY MASS INDEX: 34.12 KG/M2 | TEMPERATURE: 97.8 F | RESPIRATION RATE: 16 BRPM | OXYGEN SATURATION: 96 % | DIASTOLIC BLOOD PRESSURE: 70 MMHG

## 2021-05-20 DIAGNOSIS — Z78.0 ASYMPTOMATIC MENOPAUSAL STATE: ICD-10-CM

## 2021-05-20 DIAGNOSIS — Z12.11 COLON CANCER SCREENING: ICD-10-CM

## 2021-05-20 DIAGNOSIS — Z00.00 ROUTINE GENERAL MEDICAL EXAMINATION AT A HEALTH CARE FACILITY: Primary | ICD-10-CM

## 2021-05-20 DIAGNOSIS — Z71.89 ACP (ADVANCE CARE PLANNING): ICD-10-CM

## 2021-05-20 DIAGNOSIS — Z12.31 ENCOUNTER FOR SCREENING MAMMOGRAM FOR BREAST CANCER: ICD-10-CM

## 2021-05-20 PROCEDURE — 1123F ACP DISCUSS/DSCN MKR DOCD: CPT | Performed by: PHYSICIAN ASSISTANT

## 2021-05-20 PROCEDURE — G0438 PPPS, INITIAL VISIT: HCPCS | Performed by: PHYSICIAN ASSISTANT

## 2021-05-20 PROCEDURE — 4040F PNEUMOC VAC/ADMIN/RCVD: CPT | Performed by: PHYSICIAN ASSISTANT

## 2021-05-20 PROCEDURE — 3017F COLORECTAL CA SCREEN DOC REV: CPT | Performed by: PHYSICIAN ASSISTANT

## 2021-05-20 ASSESSMENT — PATIENT HEALTH QUESTIONNAIRE - PHQ9
SUM OF ALL RESPONSES TO PHQ QUESTIONS 1-9: 0
SUM OF ALL RESPONSES TO PHQ9 QUESTIONS 1 & 2: 0
1. LITTLE INTEREST OR PLEASURE IN DOING THINGS: 0
SUM OF ALL RESPONSES TO PHQ QUESTIONS 1-9: 0
2. FEELING DOWN, DEPRESSED OR HOPELESS: 0
2. FEELING DOWN, DEPRESSED OR HOPELESS: 0
1. LITTLE INTEREST OR PLEASURE IN DOING THINGS: 0
SUM OF ALL RESPONSES TO PHQ9 QUESTIONS 1 & 2: 0

## 2021-05-20 ASSESSMENT — LIFESTYLE VARIABLES
HOW OFTEN DO YOU HAVE A DRINK CONTAINING ALCOHOL: NEVER
HOW OFTEN DO YOU HAVE A DRINK CONTAINING ALCOHOL: 0
AUDIT TOTAL SCORE: INCOMPLETE
AUDIT-C TOTAL SCORE: INCOMPLETE

## 2021-05-20 NOTE — PROGRESS NOTES
Medicare Annual Wellness Visit  Name: Eric Mclaughlin Date: 2021   MRN: [id number] Sex: Female   Age: 77 y.o. Ethnicity: Non-/Non    : 1955 Race: Shweta Wood is here for Annual Exam (medicare)    Screenings for behavioral, psychosocial and functional/safety risks, and cognitive dysfunction are all negative except as indicated below. These results, as well as other patient data from the [address] form, are documented in Flowsheets linked to this Encounter. No Known Allergies      Prior to Visit Medications    Medication Sig Taking?  Authorizing Provider   VITAMIN D PO Take by mouth daily Yes Historical Provider, MD   Red Yeast Rice Extract (RED YEAST RICE PO) Take by mouth Yes Historical Provider, MD   albuterol sulfate  (90 Base) MCG/ACT inhaler Inhale 2 puffs into the lungs every 6 hours as needed Yes Historical Provider, MD         Past Medical History:   Diagnosis Date    Basal cell carcinoma 04/15/2021    History of echocardiogram 2021    EF 55-60%, E/A reversal indterminate diastolic function, no significant valvular abnormalities, no pericardial effusion    History of exercise stress test 2021    Treadmill, normal       Past Surgical History:   Procedure Laterality Date    CHOLECYSTECTOMY      HYSTERECTOMY           Family History   Problem Relation Age of Onset    Heart Surgery Mother     Hypertension Mother     Heart Surgery Father        CareTeam (Including outside providers/suppliers regularly involved in providing care):   Patient Care Team:  Carmenza Alarcon PA-C as PCP - General (Physician Assistant)  Carmenza Alarcon PA-C as PCP [facility]    Wt Readings from Last 3 Encounters:   21 198 lb 12.8 oz (90.2 kg)   21 203 lb 12.8 oz (92.4 kg)   21 203 lb (92.1 kg)     Vitals:    21 0824   BP: 120/70   Site: Right Upper Arm   Position: Sitting   Cuff Size: Large Adult   Pulse: 77   Resp: 16   Temp: 97.8 °F (36.6 °C)   TempSrc: Temporal   SpO2: 96%   Weight: 198 lb 12.8 oz (90.2 kg)     Body mass index is 34.12 kg/m². Based upon direct observation of the patient, evaluation of cognition reveals recent and remote memory intact. General Appearance: alert and oriented to person, place and time, well-developed and well-nourished, in no acute distress  Pulmonary/Chest: clear to auscultation bilaterally- no wheezes, rales or rhonchi, normal air movement, no respiratory distress  Cardiovascular: normal rate, normal S1 and S2, no gallops, intact distal pulses and no carotid bruits  Abdomen: soft, non-tender, non-distended, normal bowel sounds, no masses or organomegaly  Extremities: no cyanosis and no clubbing  Musculoskeletal: normal range of motion, no joint swelling, deformity or tenderness  Neurologic: gait and coordination normal and speech normal    Patient's complete Health Risk Assessment and screening values have been reviewed and are found in Flowsheets. The following problems were reviewed today and where indicated follow up appointments were made and/or referrals ordered. Positive Risk Factor Screenings with Interventions:            General Health and ACP:  General  In general, how would you say your health is?: Good  In the past 7 days, have you experienced any of the following?  New or Increased Pain, New or Increased Fatigue, Loneliness, Social Isolation, Stress or Anger?: None of These  Do you get the social and emotional support that you need?: Yes  Do you have a Living Will?: (!) No  Advance Directives     Power of [address] [place] [address] Will ACP-Advance Directive ACP-Power of     Not on File Not on File Not on File Not on File      General Health Risk Interventions:  · No Living Will: Advance Care Planning addressed with patient today    Health Habits/Nutrition:  Health Habits/Nutrition  Do you exercise for at least 20 minutes 2-3 times per week?: (!) No  Have you lost any weight without trying in the past 3 months?: No  Do you eat only one meal per day?: No  Have you seen the dentist within the past year?: (!) No     Health Habits/Nutrition Interventions:  · Inadequate physical activity:  patient is not ready to increase his/her physical activity level at this time  · Dental exam overdue:  patient encouraged to make appointment with his/her dentist    Hearing/Vision:  No exam data present  Hearing/Vision  Do you or your family notice any trouble with your hearing that hasn't been managed with hearing aids?: No  Do you have difficulty driving, watching TV, or doing any of your daily activities because of your eyesight?: No  Have you had an eye exam within the past year?: (!) No  Hearing/Vision Interventions:  · Vision concerns:  patient encouraged to make appointment with his/her eye specialist      Personalized Preventive Plan   Current Health Maintenance Status  Immunization History   Administered Date(s) Administered    Tdap (Boostrix, Adacel) 01/01/2016        Health Maintenance   Topic Date Due    Hepatitis C screen  Never done    COVID-19 Vaccine (1) Never done    Shingles Vaccine (1 of 2) Never done    Colon cancer screen colonoscopy  Never done    DEXA (modify frequency per FRAX score)  Never done    Breast cancer screen  03/30/2014    Pneumococcal 65+ years Vaccine (1 of 1 - PPSV23) Never done   [employer] Visit (AWV)  Never done    Flu vaccine (Season Ended) 09/01/2021    A1C test (Diabetic or Prediabetic)  01/18/2022    DTaP/Tdap/Td vaccine (2 - Td) 01/01/2026    Lipid screen  01/20/2026    Hepatitis A vaccine  Aged Out    Hepatitis B vaccine  Aged Out    Hib vaccine  Aged Out    Meningococcal (ACWY) vaccine  Aged Out     Recommendations for Remember The Member Due: see orders and patient instructions/AVS.  .   Recommended screening schedule for the next 5-10 years is provided to the patient in written form: see Patient Instructions/AVS.    Kunal Dave was seen today for annual exam.    Diagnoses and all orders for this visit:    Colon cancer screening  -     Cologuard (For External Results Only); Future    ACP (advance care planning)    Asymptomatic menopausal state  -     DEXA Bone Density Axial Skeleton; Future    Encounter for screening mammogram for breast cancer  -     NICOLE Digital Screen Bilateral [[id number]]; Future    Routine general medical examination at a health care facility                 Cardiovascular Disease Risk Counseling: Assessed the patient's risk to develop cardiovascular disease and reviewed main risk factors. Reviewed steps to reduce disease risk including:   · Quitting tobacco use, reducing amount smoked, or not starting the habit  · Making healthy food choices  · Being physically active and gradualy increasing activity levels   · Reduce weight and determine a healthy BMI goal  · Monitor blood pressure and treat if higher than 140/90 mmHg  · Maintain blood total cholesterol levels under 5 mmol/l or 190 mg/dl  · Maintain LDL cholesterol levels under 3.0 mmol/l or 115 mg/dl   · Control blood glucose levels  · Consider taking aspirin (75 mg daily), once blood pressure is controlled   Provided a follow up plan. Time spent (minutes): 5    Advance Care Planning   Advanced Care Planning: Discussed the patients choices for care and treatment in case of a health event that adversely affects decision-making abilities. Also discussed the patients long-term treatment options. Reviewed with the patient the [address] [place] [address] of [address] [place] [address] Declaration forms  Reviewed the process of designating a competent adult as an Agent (or -in-fact) that could take make health care decisions for the patient if incompetent. Patient was asked to complete the declaration forms, either acknowledge the forms by a public notary or an eligible witness and provide a signed copy to the practice office.   Time spent (minutes): 5

## 2021-05-20 NOTE — PATIENT INSTRUCTIONS
Advance Directives: Care Instructions  Overview  An advance directive is a legal way to state your wishes at the end of your life. It tells your family and your doctor what to do if you can't say what you want. There are two main types of advance directives. You can change them any time your wishes change. Living will. This form tells your family and your doctor your wishes about life support and other treatment. The form is also called a declaration. Medical power of . This form lets you name a person to make treatment decisions for you when you can't speak for yourself. This person is called a health care agent (health care proxy, health care surrogate). The form is also called a durable power of  for health care. If you do not have an advance directive, decisions about your medical care may be made by a family member, or by a doctor or a  who doesn't know you. It may help to think of an advance directive as a gift to the people who care for you. If you have one, they won't have to make tough decisions by themselves. Follow-up care is a key part of your treatment and safety. Be sure to make and go to all appointments, and call your doctor if you are having problems. It's also a good idea to know your test results and keep a list of the medicines you take. What should you include in an advance directive? Many states have a unique advance directive form. (It may ask you to address specific issues.) Or you might use a universal form that's approved by many states. If your form doesn't tell you what to address, it may be hard to know what to include in your advance directive. Use the questions below to help you get started. · Who do you want to make decisions about your medical care if you are not able to? · What life-support measures do you want if you have a serious illness that gets worse over time or can't be cured? · What are you most afraid of that might happen? (Maybe you're afraid of having pain, losing your independence, or being kept alive by machines.)  · Where would you prefer to die? (Your home? A hospital? A nursing home?)  · Do you want to donate your organs when you die? · Do you want certain Buddhist practices performed before you die? When should you call for help? Be sure to contact your doctor if you have any questions. Where can you learn more? Go to https://[email or web address].[email or web address] org and sign in to your [employer] account. Enter R264 in the [employer] box to learn more about \"Advance Directives: Care Instructions. \"     If you do not have an account, please click on the \"Sign Up Now\" link. Current as of: July 17, 2020               Content Version: 12.8  © 2006-2021 Healthwise, [employer]. Care instructions adapted under license by [facility] ([facility]). If you have questions about a medical condition or this instruction, always ask your healthcare professional. [place] [address] any warranty or liability for your use of this information. Learning About Medical Power of   What is a medical power of ? A medical power of , also called a durable power of  for health care, is one type of the legal forms called advance directives. It lets you name the person you want to make treatment decisions for you if you can't speak or decide for yourself. The person you choose is called your health care agent. This person is also called a health care proxy or health care surrogate. A medical power of  may be called something else in your state. How do you choose a health care agent? Choose your health care agent carefully. This person may or may not be a family member. Talk to the person before you make your final decision. Make sure he or she is comfortable with this responsibility. It's a good idea to choose someone who:  · Is at least 25years old.   · Knows you well and understands what makes life meaningful for you. · Understands your Sikhism and moral values. · Will do what you want, not what he or she wants. · Will be able to make difficult choices at a stressful time. · Will be able to refuse or stop treatment, if that is what you would want, even if you could die. · Will be firm and confident with health professionals if needed. · Will ask questions to get needed information. · Lives near you or agrees to travel to you if needed. Your family may help you make medical decisions while you can still be part of that process. But it's important to choose one person to be your health care agent in case you aren't able to make decisions for yourself. If you don't fill out the legal form and name a health care agent, the decisions your family can make may be limited. A health care agent may be called something else in your state. Who will make decisions for you if you don't have a health care agent? If you don't have a health care agent or a living will, you may not get the care you want. Decisions may be made by family members who disagree about your medical care. Or decisions may be made by a medical professional who doesn't know you well. In some cases, a  makes the decisions. When you name a health care agent, it is very clear who has the power to make health decisions for you. How do you name a health care agent? You name your health care agent on a legal form. This form is usually called a medical power of . Ask your hospital, state bar association, or office on aging where to find these forms. You must sign the form to make it legal. Some states require you to get the form notarized. This means that a person called a  watches you sign the form and then he or she signs the form. Some states also require that two or more witnesses sign the form. Be sure to tell your family members and doctors who your health care agent is.   Where can you learn more? Go to https://chpepiceweb.[place] [employer] [email or web address] org and sign in to your [employer] account. Enter [id number] in the [facility] box to learn more about \"Learning About [address]. \"     If you do not have an account, please click on the \"Sign Up Now\" link. Current as of: July 17, 2020               Content Version: 12.8  © 2006-2021 WebMD. Care instructions adapted under license by [facility] ([facility]). If you have questions about a medical condition or this instruction, always ask your healthcare professional. [address] any warranty or liability for your use of this information. Learning About Josesito Jessica  What is a living will? A living will, also called a declaration, is a legal form. It tells your family and your doctor your wishes when you can't speak for yourself. It's used by the health professionals who will treat you as you near the end of your life or if you get seriously hurt or ill. If you put your wishes in writing, your loved ones and others will know what kind of care you want. They won't need to guess. This can ease your mind and be helpful to others. And you can change or cancel your living will at any time. A living will is not the same as an estate or property will. An estate will explains what you want to happen with your money and property after you die. How do you use it? A living will is used to describe the kinds of treatment or life support you want as you near the end of your life or if you get seriously hurt or ill. Keep these facts in mind about living tran. · Your living will is used only if you can't speak or make decisions for yourself. Most often, one or more doctors must certify that you can't speak or decide for yourself before your living will takes effect. · If you get better and can speak for yourself again, you can accept or refuse any treatment.  It doesn't matter what you said in your Learning About Healthy Weight  What is a healthy weight? A healthy weight is the weight at which you feel good about yourself and have energy for work and play. It's also one that lowers your risk for health problems. What can you do to stay at a healthy weight? It can be hard to stay at a healthy weight, especially when fast food, vending-machine snacks, and processed foods are so easy to find. And with your busy lifestyle, activity may be low on your list of things to do. But staying at a healthy weight may be easier than you think. Here are some dos and don'ts for staying at a healthy weight. Do eat healthy foods  The kinds of foods you eat have a big impact on both your weight and your health. Reaching and staying at a healthy weight is not about going on a diet. It's about making healthier food choices every day and changing your diet for good. Healthy eating means eating a variety of foods so that you get all the nutrients you need. Your body needs protein, carbohydrate, and fats for energy. They keep your heart beating, your brain active, and your muscles working. On most days, try to eat from each food group. This means eating a variety of:  · Whole grains, such as whole wheat breads and pastas. · Fruits and vegetables. · Dairy products, such as low-fat milk, yogurt, and cheese. · Lean proteins, such as all types of fish, chicken without the skin, and beans. Don't have too much or too little of one thing. All foods, if eaten in moderation, can be part of healthy eating. Even sweets can be okay. If your favorite foods are high in fat, salt, sugar, or calories, limit how often you eat them. Eat smaller servings, or look for healthy substitutes. Do watch what you eat  Many people eat more than their bodies need. Part of staying at a healthy weight means learning how much food you really need from day to day and not eating more than that.  Even with healthy foods, eating too much can make you gain weight. Having a well-balanced diet means that you eat enough, but not too much, and that your food gives you the nutrients you need to stay healthy. So listen to your body. Eat when you're hungry. Stop when you feel satisfied. It's a good idea to have healthy snacks ready for when you get hungry. Keep healthy snacks with you at work, in your car, and at home. If you have a healthy snack easily available, you'll be less likely to pick a candy bar or bag of chips from a vending machine instead. Some healthy snacks you might want to keep on hand are fruit, low-fat yogurt, string cheese, low-fat microwave popcorn, raisins and other dried fruit, nuts, whole wheat crackers, pretzels, carrots, celery sticks, and broccoli. Do some physical activity  A big part of reaching and staying at a healthy weight is being active. When you're active, you burn calories. This makes it easier to reach and stay at a healthy weight. When you're active on a regular basis, your body burns more calories, even when you're at rest. Being active helps you lose fat and build lean muscle. Try to be active for at least 1 hour every day. This may sound like a lot, but it's okay to be active in smaller blocks of time that add up to 1 hour a day. Any activity that makes your heart beat faster and keeps it there for a while counts. A brisk walk, run, or swim will get your heart beating faster. So will climbing stairs, shooting baskets, or cycling. Even some household chores like vacuuming and mowing the lawn will get your heart rate up. Pick activities that you enjoy--ones that make your heart beat faster, your muscles stronger, and your muscles and joints more flexible. If you find more than one thing you like doing, do them all. You don't have to do the same thing every day. Don't diet  Diets don't work. Diets are temporary. Because you give up so much when you diet, you may be hungry and think about food all the time.  And after you ask your healthcare professional. [place] [address] any warranty or liability for your use of this information. Personalized Preventive Plan for Glee Kussmaul - 5/20/2021  Medicare offers a range of preventive health benefits. Some of the tests and screenings are paid in full while other may be subject to a deductible, co-insurance, and/or copay. Some of these benefits include a comprehensive review of your medical history including lifestyle, illnesses that may run in your family, and various assessments and screenings as appropriate. After reviewing your medical record and screening and assessments performed today your provider may have ordered immunizations, labs, imaging, and/or referrals for you. A list of these orders (if applicable) as well as your Preventive Care list are included within your After Visit Summary for your review. Other Preventive Recommendations:    · A preventive eye exam performed by an eye specialist is recommended every 1-2 years to screen for glaucoma; cataracts, macular degeneration, and other eye disorders. · A preventive dental visit is recommended every 6 months. · Try to get at least 150 minutes of exercise per week or 10,000 steps per day on a pedometer . · Order or download the FREE \"Exercise & Physical Activity: Your Everyday Guide\" from [employer]. Call 7-159.674.1942 or search [employer] online. · You need 5655-3657 mg of calcium and 7290-7439 IU of vitamin D per day. It is possible to meet your calcium requirement with diet alone, but a vitamin D supplement is usually necessary to meet this goal.  · When exposed to the sun, use a sunscreen that protects against both UVA and UVB radiation with an SPF of 30 or greater. Reapply every 2 to 3 hours or after sweating, drying off with a towel, or swimming. · Always wear a seat belt when traveling in a car.  Always wear a helmet when riding a bicycle or motorcycle.

## 2021-06-02 DIAGNOSIS — Z12.11 COLON CANCER SCREENING: ICD-10-CM

## 2021-06-24 ENCOUNTER — TELEPHONE (OUTPATIENT)
Dept: FAMILY MEDICINE CLINIC | Age: 66
End: 2021-06-24

## 2021-06-24 NOTE — TELEPHONE ENCOUNTER
Called and left message for patient to call the office. Patient has called to get the results of her cologuard results is negative.

## 2021-07-14 ENCOUNTER — HOSPITAL ENCOUNTER (OUTPATIENT)
Dept: WOMENS IMAGING | Age: 66
Discharge: HOME | End: 2021-07-14
Payer: MEDICARE

## 2021-07-14 DIAGNOSIS — Z12.31 ENCOUNTER FOR SCREENING MAMMOGRAM FOR BREAST CANCER: ICD-10-CM

## 2021-07-14 DIAGNOSIS — Z78.0 ASYMPTOMATIC MENOPAUSAL STATE: ICD-10-CM

## 2021-07-14 PROCEDURE — 77080 DXA BONE DENSITY AXIAL: CPT

## 2021-07-14 PROCEDURE — 77063 BREAST TOMOSYNTHESIS BI: CPT

## 2021-07-22 ENCOUNTER — TELEPHONE (OUTPATIENT)
Dept: FAMILY MEDICINE CLINIC | Age: 66
End: 2021-07-22

## 2021-07-22 RX ORDER — ALENDRONATE SODIUM 70 MG/1
70 TABLET ORAL
Qty: 4 TABLET | Refills: 5 | Status: SHIPPED | OUTPATIENT
Start: 2021-07-22 | End: 2021-10-21

## 2021-07-22 NOTE — TELEPHONE ENCOUNTER
Spoke with patient mammogram and bone density test results given patient would like to start the once a week fosamax advised of the recommendations of the medication.  Patient verbalized understanding

## 2021-10-21 ENCOUNTER — OFFICE VISIT (OUTPATIENT)
Dept: FAMILY MEDICINE CLINIC | Age: 66
End: 2021-10-21
Payer: MEDICARE

## 2021-10-21 VITALS
SYSTOLIC BLOOD PRESSURE: 110 MMHG | OXYGEN SATURATION: 93 % | TEMPERATURE: 97 F | DIASTOLIC BLOOD PRESSURE: 70 MMHG | BODY MASS INDEX: 35.6 KG/M2 | RESPIRATION RATE: 16 BRPM | HEART RATE: 70 BPM | WEIGHT: 207.4 LBS

## 2021-10-21 DIAGNOSIS — E11.9 TYPE 2 DIABETES MELLITUS WITHOUT COMPLICATION, WITHOUT LONG-TERM CURRENT USE OF INSULIN (HCC): Primary | ICD-10-CM

## 2021-10-21 DIAGNOSIS — M81.0 AGE-RELATED OSTEOPOROSIS WITHOUT CURRENT PATHOLOGICAL FRACTURE: ICD-10-CM

## 2021-10-21 DIAGNOSIS — E78.2 MIXED HYPERLIPIDEMIA: ICD-10-CM

## 2021-10-21 DIAGNOSIS — J45.20 MILD INTERMITTENT ASTHMA WITHOUT COMPLICATION: ICD-10-CM

## 2021-10-21 DIAGNOSIS — E55.9 VITAMIN D DEFICIENCY: ICD-10-CM

## 2021-10-21 PROBLEM — M62.838 MUSCLE SPASM: Status: RESOLVED | Noted: 2021-01-18 | Resolved: 2021-10-21

## 2021-10-21 PROBLEM — R73.03 PREDIABETES: Status: ACTIVE | Noted: 2021-10-21

## 2021-10-21 PROBLEM — R06.02 SHORTNESS OF BREATH: Status: RESOLVED | Noted: 2021-01-18 | Resolved: 2021-10-21

## 2021-10-21 LAB — HBA1C MFR BLD: 6.6 %

## 2021-10-21 PROCEDURE — 99214 OFFICE O/P EST MOD 30 MIN: CPT | Performed by: PHYSICIAN ASSISTANT

## 2021-10-21 PROCEDURE — 2022F DILAT RTA XM EVC RTNOPTHY: CPT | Performed by: PHYSICIAN ASSISTANT

## 2021-10-21 PROCEDURE — 1036F TOBACCO NON-USER: CPT | Performed by: PHYSICIAN ASSISTANT

## 2021-10-21 PROCEDURE — G8484 FLU IMMUNIZE NO ADMIN: HCPCS | Performed by: PHYSICIAN ASSISTANT

## 2021-10-21 PROCEDURE — 3044F HG A1C LEVEL LT 7.0%: CPT | Performed by: PHYSICIAN ASSISTANT

## 2021-10-21 PROCEDURE — G8417 CALC BMI ABV UP PARAM F/U: HCPCS | Performed by: PHYSICIAN ASSISTANT

## 2021-10-21 PROCEDURE — 3017F COLORECTAL CA SCREEN DOC REV: CPT | Performed by: PHYSICIAN ASSISTANT

## 2021-10-21 PROCEDURE — 1123F ACP DISCUSS/DSCN MKR DOCD: CPT | Performed by: PHYSICIAN ASSISTANT

## 2021-10-21 PROCEDURE — G8427 DOCREV CUR MEDS BY ELIG CLIN: HCPCS | Performed by: PHYSICIAN ASSISTANT

## 2021-10-21 PROCEDURE — 4040F PNEUMOC VAC/ADMIN/RCVD: CPT | Performed by: PHYSICIAN ASSISTANT

## 2021-10-21 PROCEDURE — G8399 PT W/DXA RESULTS DOCUMENT: HCPCS | Performed by: PHYSICIAN ASSISTANT

## 2021-10-21 PROCEDURE — 1090F PRES/ABSN URINE INCON ASSESS: CPT | Performed by: PHYSICIAN ASSISTANT

## 2021-10-21 PROCEDURE — 83036 HEMOGLOBIN GLYCOSYLATED A1C: CPT | Performed by: PHYSICIAN ASSISTANT

## 2021-10-21 RX ORDER — ALBUTEROL SULFATE 90 UG/1
2 AEROSOL, METERED RESPIRATORY (INHALATION) EVERY 6 HOURS PRN
Qty: 1 G | Refills: 5 | Status: SHIPPED | OUTPATIENT
Start: 2021-10-21

## 2021-10-21 RX ORDER — IBANDRONATE SODIUM 150 MG/1
150 TABLET, FILM COATED ORAL
Qty: 1 TABLET | Refills: 11 | Status: SHIPPED | OUTPATIENT
Start: 2021-10-21 | End: 2022-07-14

## 2021-10-21 ASSESSMENT — PATIENT HEALTH QUESTIONNAIRE - PHQ9
SUM OF ALL RESPONSES TO PHQ QUESTIONS 1-9: 0
2. FEELING DOWN, DEPRESSED OR HOPELESS: 0
SUM OF ALL RESPONSES TO PHQ9 QUESTIONS 1 & 2: 0
1. LITTLE INTEREST OR PLEASURE IN DOING THINGS: 0

## 2021-10-21 ASSESSMENT — ENCOUNTER SYMPTOMS
WHEEZING: 1
GASTROINTESTINAL NEGATIVE: 1

## 2021-10-21 NOTE — PROGRESS NOTES
[facility]  1955  77 y.o.  female    SUBJECTIVE:    Chief Complaint   Patient presents with    Follow-up     patient is here for 5 month follow up     Flu Vaccine     declines       HPI   Pt here for routine f/u. Pt states she is doing well overall, has some concerns with weight. Does not routinely exercise ( asthma and reports wheezing when exercising). Does not follow any particular diet restrictions. Vit d deficiency-vit D 19.2 in January of this year. Bought vit d supplement but has not taken. Dexa scan positive for osteoporosis but pt unable to tolerate fosamax due to diarrhea. Prediabetes-A1C 6.3 in January of this year. Denies polyuria/polydipsia/polyphagia at this time. Hyperlipidemia-pt advised to start red yeast rice, has not initiated therapy at this time. Not routinely exercising or following low cholesterol diet. PHQ Scores 10/21/2021 5/20/2021 5/20/2021 1/18/2021   PHQ2 Score 0 0 0 0   PHQ9 Score 0 0 0 0     Interpretation of Total Score Depression Severity: 1-4 = Minimal depression, 5-9 = Mild depression, 10-14 = Moderate depression, 15-19 = Moderately severe depression, 20-27 = Severe depression     Current Outpatient Medications on File Prior to Visit   Medication Sig Dispense Refill    VITAMIN D PO Take by mouth daily (Patient not taking: Reported on 10/21/2021)      Red Yeast Rice Extract (RED YEAST RICE PO) Take by mouth (Patient not taking: Reported on 10/21/2021)       No current facility-administered medications on file prior to visit.        No Known Allergies    Past Medical History:   Diagnosis Date    Basal cell carcinoma 04/15/2021    History of echocardiogram 01/27/2021    EF 55-60%, E/A reversal indterminate diastolic function, no significant valvular abnormalities, no pericardial effusion    History of exercise stress test 01/27/2021    Treadmill, normal    Muscle spasm 1/18/2021    Shortness of breath 1/18/2021       Past Surgical History: Procedure Laterality Date    CHOLECYSTECTOMY      HYSTERECTOMY      OVARY REMOVAL      age 39       Social History     Socioeconomic History    Marital status:      Spouse name: None    Number of children: None    Years of education: None    Highest education level: None   Occupational History    None   Tobacco Use    Smoking status: Never Smoker    Smokeless tobacco: Never Used   Vaping Use    Vaping Use: Never assessed   Substance and Sexual Activity    Alcohol use: Not Currently     Alcohol/week: 0.0 standard drinks    Drug use: Never    Sexual activity: Yes     Partners: Male   Other Topics Concern    None   Social History Narrative    None     Social Determinants of Health     Financial Resource Strain:     Difficulty of Paying Living Expenses:    Food Insecurity:     Worried About Running Out of Food in the Last Year:     Ran Out of Food in the Last Year:    Transportation Needs:     Lack of Transportation (Medical):  Lack of Transportation (Non-Medical):    Physical Activity:     Days of Exercise per Week:     Minutes of Exercise per Session:    Stress:     Feeling of Stress :    Social Connections:     Frequency of Communication with Friends and Family:     Frequency of Social Gatherings with Friends and Family:     Attends Protestant Services:     Active Member of Clubs or Organizations:     Attends Club or Organization Meetings:     Marital Status:    Intimate Partner Violence:     Fear of Current or Ex-Partner:     Emotionally Abused:     Physically Abused:     Sexually Abused:        Review of Systems   Constitutional: Negative for chills and fever. Respiratory: Positive for wheezing. With exercise only   Cardiovascular: Negative. Gastrointestinal: Negative. Endocrine: Negative for polydipsia, polyphagia and polyuria.        OBJECTIVE:    /70 (Site: Left Upper Arm, Position: Sitting, Cuff Size: Large Adult)   Pulse 70   Temp 97 °F (36.1 °C) (Temporal)   Resp 16   Wt 207 lb 6.4 oz (94.1 kg)   SpO2 93%   BMI 35.60 kg/m²     Physical Exam  Vitals reviewed. Constitutional:       General: She is not in acute distress. Appearance: Normal appearance. HENT:      Head: Normocephalic. Right Ear: External ear normal.      Left Ear: External ear normal.   Eyes:      Extraocular Movements: Extraocular movements intact. Cardiovascular:      Rate and Rhythm: Normal rate and regular rhythm. Heart sounds: Normal heart sounds. Pulmonary:      Effort: Pulmonary effort is normal.      Breath sounds: Normal breath sounds. Musculoskeletal:         General: Normal range of motion. Skin:     General: Skin is warm and dry. Neurological:      Mental Status: She is alert and oriented to person, place, and time. Ken Agosto:    Problem List        Musculoskeletal and Integument    Age-related osteoporosis without current pathological fracture     Pt will try boniva as she was unable to tolerate fosamax due to diarrhea          Relevant Medications    ibandronate (BONIVA) 150 MG tablet       Respiratory    Mild intermittent asthma without complication     Refill albuterol, use prior to exercising and then prn          Relevant Medications    albuterol sulfate  (90 Base) MCG/ACT inhaler       Other    Vitamin D deficiency     Recheck labs now          Relevant Orders    VITAMIN D 25 HYDROXY    Prediabetes - Primary     A1C today now 6.6. Discussed at length lifestyle changes to manage disease. Pt voices understanding and will recheck in 3 months for A1C          Mixed hyperlipidemia     Will recheck labs today. Did not start red yeast rice          Relevant Orders    Lipid, Fasting    Comprehensive Metabolic Panel               Return in about 3 months (around 1/21/2022).

## 2021-10-21 NOTE — ASSESSMENT & PLAN NOTE
A1C today now 6.6. Discussed at length lifestyle changes to manage disease.  Pt voices understanding and will recheck in 3 months for A1C

## 2021-10-22 LAB
A/G RATIO: 1.7 (ref 1.1–2.2)
ALBUMIN SERPL-MCNC: 4.3 G/DL (ref 3.4–5)
ALP BLD-CCNC: 83 U/L (ref 40–129)
ALT SERPL-CCNC: 21 U/L (ref 10–40)
ANION GAP SERPL CALCULATED.3IONS-SCNC: 14 MMOL/L (ref 3–16)
AST SERPL-CCNC: 20 U/L (ref 15–37)
BILIRUB SERPL-MCNC: 0.3 MG/DL (ref 0–1)
BUN BLDV-MCNC: 15 MG/DL (ref 7–20)
CALCIUM SERPL-MCNC: 9.1 MG/DL (ref 8.3–10.6)
CHLORIDE BLD-SCNC: 104 MMOL/L (ref 99–110)
CHOLESTEROL, FASTING: 206 MG/DL (ref 0–199)
CO2: 22 MMOL/L (ref 21–32)
CREAT SERPL-MCNC: 0.6 MG/DL (ref 0.6–1.2)
GFR AFRICAN AMERICAN: >60
GFR NON-AFRICAN AMERICAN: >60
GLOBULIN: 2.6 G/DL
GLUCOSE BLD-MCNC: 142 MG/DL (ref 70–99)
HDLC SERPL-MCNC: 47 MG/DL (ref 40–60)
LDL CHOLESTEROL CALCULATED: 127 MG/DL
POTASSIUM SERPL-SCNC: 4.6 MMOL/L (ref 3.5–5.1)
SODIUM BLD-SCNC: 140 MMOL/L (ref 136–145)
TOTAL PROTEIN: 6.9 G/DL (ref 6.4–8.2)
TRIGLYCERIDE, FASTING: 160 MG/DL (ref 0–150)
VITAMIN D 25-HYDROXY: 16.4 NG/ML
VLDLC SERPL CALC-MCNC: 32 MG/DL

## 2022-03-01 ENCOUNTER — HOSPITAL ENCOUNTER (OUTPATIENT)
Age: 67
Discharge: HOME | End: 2022-03-01
Payer: MEDICARE

## 2022-03-01 ENCOUNTER — OFFICE VISIT (OUTPATIENT)
Dept: FAMILY MEDICINE CLINIC | Age: 67
End: 2022-03-01
Payer: MEDICARE

## 2022-03-01 ENCOUNTER — HOSPITAL ENCOUNTER (OUTPATIENT)
Dept: GENERAL RADIOLOGY | Age: 67
Discharge: HOME | End: 2022-03-01
Payer: MEDICARE

## 2022-03-01 VITALS
OXYGEN SATURATION: 97 % | RESPIRATION RATE: 16 BRPM | TEMPERATURE: 98 F | HEART RATE: 77 BPM | WEIGHT: 205.8 LBS | DIASTOLIC BLOOD PRESSURE: 78 MMHG | BODY MASS INDEX: 35.33 KG/M2 | SYSTOLIC BLOOD PRESSURE: 124 MMHG

## 2022-03-01 DIAGNOSIS — M79.672 PAIN OF LEFT HEEL: ICD-10-CM

## 2022-03-01 DIAGNOSIS — M81.0 AGE-RELATED OSTEOPOROSIS WITHOUT CURRENT PATHOLOGICAL FRACTURE: ICD-10-CM

## 2022-03-01 DIAGNOSIS — E55.9 VITAMIN D DEFICIENCY: ICD-10-CM

## 2022-03-01 DIAGNOSIS — R73.03 PREDIABETES: Primary | ICD-10-CM

## 2022-03-01 DIAGNOSIS — E78.2 MIXED HYPERLIPIDEMIA: ICD-10-CM

## 2022-03-01 LAB — HBA1C MFR BLD: 6.3 %

## 2022-03-01 PROCEDURE — G8417 CALC BMI ABV UP PARAM F/U: HCPCS | Performed by: PHYSICIAN ASSISTANT

## 2022-03-01 PROCEDURE — 4040F PNEUMOC VAC/ADMIN/RCVD: CPT | Performed by: PHYSICIAN ASSISTANT

## 2022-03-01 PROCEDURE — G8484 FLU IMMUNIZE NO ADMIN: HCPCS | Performed by: PHYSICIAN ASSISTANT

## 2022-03-01 PROCEDURE — 83036 HEMOGLOBIN GLYCOSYLATED A1C: CPT | Performed by: PHYSICIAN ASSISTANT

## 2022-03-01 PROCEDURE — 1123F ACP DISCUSS/DSCN MKR DOCD: CPT | Performed by: PHYSICIAN ASSISTANT

## 2022-03-01 PROCEDURE — 3017F COLORECTAL CA SCREEN DOC REV: CPT | Performed by: PHYSICIAN ASSISTANT

## 2022-03-01 PROCEDURE — 1090F PRES/ABSN URINE INCON ASSESS: CPT | Performed by: PHYSICIAN ASSISTANT

## 2022-03-01 PROCEDURE — 1036F TOBACCO NON-USER: CPT | Performed by: PHYSICIAN ASSISTANT

## 2022-03-01 PROCEDURE — 73630 X-RAY EXAM OF FOOT: CPT

## 2022-03-01 PROCEDURE — G8427 DOCREV CUR MEDS BY ELIG CLIN: HCPCS | Performed by: PHYSICIAN ASSISTANT

## 2022-03-01 PROCEDURE — 99214 OFFICE O/P EST MOD 30 MIN: CPT | Performed by: PHYSICIAN ASSISTANT

## 2022-03-01 PROCEDURE — G8399 PT W/DXA RESULTS DOCUMENT: HCPCS | Performed by: PHYSICIAN ASSISTANT

## 2022-03-01 ASSESSMENT — PATIENT HEALTH QUESTIONNAIRE - PHQ9
SUM OF ALL RESPONSES TO PHQ QUESTIONS 1-9: 0
2. FEELING DOWN, DEPRESSED OR HOPELESS: 0
SUM OF ALL RESPONSES TO PHQ QUESTIONS 1-9: 0
SUM OF ALL RESPONSES TO PHQ9 QUESTIONS 1 & 2: 0
1. LITTLE INTEREST OR PLEASURE IN DOING THINGS: 0

## 2022-03-01 NOTE — PROGRESS NOTES
Lety Pena  1955  77 y.o.  female    SUBJECTIVE:    Chief Complaint   Patient presents with    Other     Pt here for f/u    Mass     Reports painful, red bump on back of heel x 1 month. Painful to walk    Diabetes       HPI  DM- A1C last visit 6.6 in October 2021. Pt states life has been very stressful over  past few months, uncle passed away ang pt in charge of estate-states he was a hoarder and she has had to work on getting home cleared/ready for sale. States she has not followed diet closely but has been more active. Vit d deficiency- 19.2 last January, advised to start supplement but pt did not, repeated lab despite not taking and level had dropped to 16.4. Pt still not taking supplements routinely. She does have osteoporosis (per Dexa 7/14/2021) and was prescribed boniva but is also not taking this medication     hyperlidemia-sporadic use of red yeast rice, not following low chol diet at this time    The 10-year ASCVD risk score (Julia Hair., et al., 2013) is: 6.3%    Values used to calculate the score:      Age: 77 years      Sex: Female      Is Non- : No      Diabetic: No      Tobacco smoker: No      Systolic Blood Pressure: 405 mmHg      Is BP treated: No      HDL Cholesterol: 47 mg/dL      Total Cholesterol: 206 mg/dL     Vit d deficiency- not taking supplement    Left heel pain-one month, no injury but pain did start after wearing heavy pair of shoes and walking around mall. She states she has large knot on back of heel that is tender/painful at times.        PHQ Scores 3/1/2022 10/21/2021 5/20/2021 5/20/2021 1/18/2021   PHQ2 Score 0 0 0 0 0   PHQ9 Score 0 0 0 0 0     Interpretation of Total Score Depression Severity: 1-4 = Minimal depression, 5-9 = Mild depression, 10-14 = Moderate depression, 15-19 = Moderately severe depression, 20-27 = Severe depression     Current Outpatient Medications on File Prior to Visit   Medication Sig Dispense Refill    Multiple Vitamins-Minerals (VITEYES AREDS FORMULA PO) Take by mouth      albuterol sulfate  (90 Base) MCG/ACT inhaler Inhale 2 puffs into the lungs every 6 hours as needed for Wheezing 1 g 5    VITAMIN D PO Take by mouth daily       Red Yeast Rice Extract (RED YEAST RICE PO) Take by mouth       ibandronate (BONIVA) 150 MG tablet Take 1 tablet by mouth every 30 days Take one (1) tablet once per month in the morning with a full glass of water, on an empty stomach, and do not take anything else by mouth or lie down for the next 30 minutes. (Patient not taking: Reported on 3/1/2022) 1 tablet 11     No current facility-administered medications on file prior to visit.        No Known Allergies    Past Medical History:   Diagnosis Date    Basal cell carcinoma 04/15/2021    History of echocardiogram 01/27/2021    EF 55-60%, E/A reversal indterminate diastolic function, no significant valvular abnormalities, no pericardial effusion    History of exercise stress test 01/27/2021    Treadmill, normal    Muscle spasm 1/18/2021    Shortness of breath 1/18/2021       Past Surgical History:   Procedure Laterality Date    CHOLECYSTECTOMY      HYSTERECTOMY      OVARY REMOVAL      age 39       Social History     Socioeconomic History    Marital status:      Spouse name: None    Number of children: None    Years of education: None    Highest education level: None   Occupational History    None   Tobacco Use    Smoking status: Never Smoker    Smokeless tobacco: Never Used   Vaping Use    Vaping Use: None   Substance and Sexual Activity    Alcohol use: Not Currently     Alcohol/week: 0.0 standard drinks    Drug use: Never    Sexual activity: Yes     Partners: Male   Other Topics Concern    None   Social History Narrative    None     Social Determinants of Health     Financial Resource Strain:     Difficulty of Paying Living Expenses: Not on file   Food Insecurity:     Worried About Running Out of Food in the Last Year: Not on file    Ran Out of Food in the Last Year: Not on file   Transportation Needs:     Lack of Transportation (Medical): Not on file    Lack of Transportation (Non-Medical): Not on file   Physical Activity:     Days of Exercise per Week: Not on file    Minutes of Exercise per Session: Not on file   Stress:     Feeling of Stress : Not on file   Social Connections:     Frequency of Communication with Friends and Family: Not on file    Frequency of Social Gatherings with Friends and Family: Not on file    Attends Hinduism Services: Not on file    Active Member of [address] [place] [address] [employer] or Organizations: Not on file    Attends Club or Organization Meetings: Not on file    Marital Status: Not on file   Intimate Partner Violence:     Fear of Current or Ex-Partner: Not on file    Emotionally Abused: Not on file    Physically Abused: Not on file    Sexually Abused: Not on file   Housing Stability:     Unable to Pay for Housing in the Last Year: Not on file    Number of [place] in the Last Year: Not on file    Unstable Housing in the Last Year: Not on file       Review of Systems   Constitutional: Negative for chills and fever. Respiratory: Negative. Cardiovascular: Negative. Gastrointestinal: Negative. Endocrine: Negative for polydipsia, polyphagia and polyuria. Musculoskeletal: Positive for arthralgias. Neurological: Negative. OBJECTIVE:    /78 (Site: Left Upper Arm, Position: Sitting, Cuff Size: Large Adult)   Pulse 77   Temp 98 °F (36.7 °C)   Resp 16   Wt 205 lb 12.8 oz (93.4 kg)   SpO2 97%   BMI 35.33 kg/m²     Physical Exam  Vitals reviewed. Constitutional:       General: She is not in acute distress. Appearance: Normal appearance. HENT:      Head: Normocephalic. Right Ear: External ear normal.      Left Ear: External ear normal.   Cardiovascular:      Rate and Rhythm: Normal rate and regular rhythm. Heart sounds: Normal heart sounds. Pulmonary:      Effort: Pulmonary effort is normal.      Breath sounds: Normal breath sounds. Musculoskeletal:      Cervical back: Normal range of motion and neck supple. Feet:    Feet:      Comments: Nodule noted posterior left heel, tender to palpation  Skin:     General: Skin is warm and dry. Neurological:      Mental Status: She is alert and oriented to person, place, and time. Bhavesh Suarez:    Problem List        Musculoskeletal and Integument    Age-related osteoporosis without current pathological fracture     Due for dexa next year, pt has not started medication. Aware of risks/benefits/SE          Relevant Medications    ibandronate (BONIVA) 150 MG tablet       Other    Vitamin D deficiency     Encouraged to start supplementation          Prediabetes - Primary     The patient is asked to make an attempt to improve diet and exercise patterns to aid in medical management of this problem. Relevant Orders    POCT glycosylated hemoglobin (Hb A1C) (Completed)    Pain of left heel    Relevant Orders    XR FOOT LEFT (MIN 3 VIEWS) (Completed)    Mixed hyperlipidemia     Encouraged to start red yeast rice, follow low chol diet, increase execise                     Return in about 3 months (around 5/23/2022) for medicare wellness.

## 2022-03-04 DIAGNOSIS — M77.32 CALCANEAL SPUR OF LEFT FOOT: Primary | ICD-10-CM

## 2022-03-04 PROBLEM — M79.672 PAIN OF LEFT HEEL: Status: ACTIVE | Noted: 2022-03-04

## 2022-03-04 ASSESSMENT — ENCOUNTER SYMPTOMS
GASTROINTESTINAL NEGATIVE: 1
RESPIRATORY NEGATIVE: 1

## 2022-05-11 ENCOUNTER — HOSPITAL ENCOUNTER (EMERGENCY)
Age: 67
Discharge: HOME | End: 2022-05-11
Attending: EMERGENCY MEDICINE
Payer: MEDICARE

## 2022-05-11 ENCOUNTER — APPOINTMENT (OUTPATIENT)
Dept: GENERAL RADIOLOGY | Age: 67
End: 2022-05-11
Payer: MEDICARE

## 2022-05-11 VITALS
TEMPERATURE: 97.1 F | BODY MASS INDEX: 34.99 KG/M2 | WEIGHT: 210 LBS | HEIGHT: 65 IN | HEART RATE: 90 BPM | SYSTOLIC BLOOD PRESSURE: 178 MMHG | DIASTOLIC BLOOD PRESSURE: 73 MMHG | OXYGEN SATURATION: 94 % | RESPIRATION RATE: 18 BRPM

## 2022-05-11 DIAGNOSIS — J40 BRONCHITIS: Primary | ICD-10-CM

## 2022-05-11 PROCEDURE — 6360000002 HC RX W HCPCS: Performed by: EMERGENCY MEDICINE

## 2022-05-11 PROCEDURE — 99283 EMERGENCY DEPT VISIT LOW MDM: CPT

## 2022-05-11 PROCEDURE — 71046 X-RAY EXAM CHEST 2 VIEWS: CPT

## 2022-05-11 PROCEDURE — 6370000000 HC RX 637 (ALT 250 FOR IP): Performed by: EMERGENCY MEDICINE

## 2022-05-11 RX ORDER — ALBUTEROL SULFATE 2.5 MG/3ML
2.5 SOLUTION RESPIRATORY (INHALATION) ONCE
Status: COMPLETED | OUTPATIENT
Start: 2022-05-11 | End: 2022-05-11

## 2022-05-11 RX ORDER — GUAIFENESIN AND DEXTROMETHORPHAN HYDROBROMIDE 100; 10 MG/5ML; MG/5ML
5 SOLUTION ORAL EVERY 4 HOURS PRN
Qty: 120 ML | Refills: 0 | Status: SHIPPED | OUTPATIENT
Start: 2022-05-11 | End: 2022-07-14

## 2022-05-11 RX ORDER — PREDNISONE 50 MG/1
50 TABLET ORAL DAILY
Qty: 5 TABLET | Refills: 0 | Status: SHIPPED | OUTPATIENT
Start: 2022-05-11 | End: 2022-05-16

## 2022-05-11 RX ADMIN — PREDNISONE 50 MG: 20 TABLET ORAL at 15:19

## 2022-05-11 RX ADMIN — ALBUTEROL SULFATE 2.5 MG: 2.5 SOLUTION RESPIRATORY (INHALATION) at 15:20

## 2022-05-11 ASSESSMENT — PAIN - FUNCTIONAL ASSESSMENT: PAIN_FUNCTIONAL_ASSESSMENT: NONE - DENIES PAIN

## 2022-05-11 NOTE — ED PROVIDER NOTES
EMERGENCY DEPARTMENT ENCOUNTER    Patient: Basil Shen  MRN: [id number]  : 1955  Date of Evaluation: 2022  ED Provider:  Sabrina Wolff MD    CHIEF COMPLAINT  Chief Complaint   Patient presents with    Cough    Wheezing       HPI  Cleo Baltazar Smoker is a 79 y.o. female who presents with complaints of nasal congestion, runny nose, productive cough and wheezing for the last 4 days. Symptoms mostly symptoms are mostly constant but worsened at nighttime. She has tried Lucía Jurgen and some over-the-counter cold medications without much improvement. Does have an albuterol inhaler at home which does help with the wheezing. Denies any other associated symptoms or complaints or concerns.       REVIEW OF SYSTEMS    Constitutional: negative for fever, chills  Neurological: negative for HA, focal weakness, loss of sensation  Ophthalmic: negative for vision change  ENT: negative for sore throat  Cardiovascular: negative for chest pain, palpitations, edema  Respiratory: as in HPI  GI: negative for abdominal pain, nausea, vomiting, diarrhea, constipation  : negative for dysuria, hematuria  Musculoskeletal: negative for myalgias, decreased ROM, joint swelling  Dermatological: negative for rash, wounds  Heme: Negative for bleeding, bruising      PAST MEDICAL HISTORY  Past Medical History:   Diagnosis Date    Asthma     Basal cell carcinoma 04/15/2021    History of echocardiogram 2021    EF 55-60%, E/A reversal indterminate diastolic function, no significant valvular abnormalities, no pericardial effusion    History of exercise stress test 2021    Treadmill, normal    Muscle spasm 2021    Shortness of breath 2021       CURRENT MEDICATIONS  [unfilled]    ALLERGIES  No Known Allergies    SURGICAL HISTORY  Past Surgical History:   Procedure Laterality Date    CHOLECYSTECTOMY      HYSTERECTOMY      OVARY REMOVAL      age 39       FAMILY HISTORY  Family History   Problem Relation Age of Onset    Heart Surgery Mother     Hypertension Mother     Heart Surgery Father        SOCIAL HISTORY  Social History     Socioeconomic History    Marital status:      Spouse name: None    Number of children: None    Years of education: None    Highest education level: None   Occupational History    None   Tobacco Use    Smoking status: Never Smoker    Smokeless tobacco: Never Used   Vaping Use    Vaping Use: None   Substance and Sexual Activity    Alcohol use: Not Currently     Alcohol/week: 0.0 standard drinks    Drug use: Never    Sexual activity: Yes     Partners: Male   Other Topics Concern    None   Social History Narrative    None     Social Determinants of Health     Financial Resource Strain:     Difficulty of Paying Living Expenses: Not on file   Food Insecurity:     Worried About Running Out of Food in the Last Year: Not on file    Jyothi of Food in the Last Year: Not on file   Transportation Needs:     Lack of Transportation (Medical): Not on file    Lack of Transportation (Non-Medical):  Not on file   Physical Activity:     Days of Exercise per Week: Not on file    Minutes of Exercise per Session: Not on file   Stress:     Feeling of Stress : Not on file   Social Connections:     Frequency of Communication with Friends and Family: Not on file    Frequency of Social Gatherings with Friends and Family: Not on file    Attends Mormonism Services: Not on file    Active Member of [address] [place] [address] or Organizations: Not on file    Attends Club or Organization Meetings: Not on file    Marital Status: Not on file   Intimate Partner Violence:     Fear of Current or Ex-Partner: Not on file    Emotionally Abused: Not on file    Physically Abused: Not on file    Sexually Abused: Not on file   Housing Stability:     Unable to Pay for Housing in the Last Year: Not on file    Number of [place] in the Last Year: Not on file    Unstable Housing in the Last Year: Not on file         **Past medical, family and social histories, and nursing notes reviewed and verified by me**      PHYSICAL EXAM  VITAL SIGNS:   ED Triage Vitals   Enc Vitals Group      BP       Pulse       Resp       Temp       Temp src       SpO2       Weight       Height       Head Circumference       Peak Flow       Pain Score       Pain Loc       Pain Edu? Excl. in [address]? Vitals during ED course were reviewed and are as charted. Constitutional: Minimal distress, Non-toxic appearance  Eyes: Conjunctiva normal, No discharge  HENT: Normocephalic, Atraumatic, posterior oropharynx is nonerythematous and without exudate, uvula is midline, oropharynx moist  Neck: Supple, no stridor, no grossly visible or palpable masses  Cardiovascular: Regular rate and rhythm, No murmurs, No rubs, No gallops, no JVD  Pulmonary/Chest: End expiratory wheezing, otherwise, No respiratory distress or accessory muscle use, No crackles or rhonchi. Abdomen: Soft, nondistended and nonrigid, No tenderness or peritoneal signs, No masses, normal bowel sounds  Back: No midline point tenderness, No paraspinous muscle tenderness. No CVA tenderness  Extremities: No gross deformities, no edema, no tenderness  Neurologic: Normal motor function, Normal sensory function, No focal deficits  Skin: Warm, Dry, No erythema, No rash, No cyanosis, No mottling  Lymphatic: No lymphadenopathy in the following location(s): cervical  Psychiatric: Alert and oriented x3, Affect normal        RADIOLOGY/PROCEDURES/LABS/MEDICATIONS ADMINISTERED:    I have reviewed and interpreted all of the currently available lab results from this visit (if applicable):  No results found for this visit on 05/11/22. ABNORMAL LABS:  Labs Reviewed - No data to display      IMAGING STUDIES ORDERED:  XR CHEST (2 VW)    I have personally viewed the imaging studies.  The radiologist interpretation is:   XR CHEST (2 VW)   Final Result   Mild central bronchial thickening which may be related to reactive airway   disease without focal suspicious infiltrate. MEDICATIONS ADMINISTERED:  Medications   predniSONE (DELTASONE) tablet 50 mg (50 mg Oral Given 5/11/22 1519)   albuterol (PROVENTIL) nebulizer solution 2.5 mg (2.5 mg Nebulization Given 5/11/22 1520)         COURSE & MEDICAL DECISION MAKING  Last vitals: BP (!) 178/73   Pulse 90   Temp 97.1 °F (36.2 °C) (Skin)   Resp 18   Ht 5' 5\" (1.651 m)   Wt 210 lb (95.3 kg)   SpO2 94%   BMI 34.95 kg/m²     42-year-old female with symptoms consistent with bronchitis. Likely viral in etiology. Not in any respiratory distress and satting well on room air. No radiographic evidence for pneumonia. Will treat with course of steroids. Differential diagnoses considered included, but were not limited to, acute bronchospasm, allergic reaction/anaphylaxis, acute respiratory infection, pneumonia, acute coronary syndrome,  congestive heart failure, non-CHF-related pulmonary edema, pneumothorax. Additional workup and treatment in the ED as documented above. Patient reassured and will be discharged home. I have explained to the patient in appropriate terminology our work-up in the ED and their diagnosis. I have also given anticipatory guidance and expectant management of their condition as an outpatient as per my custom. The patient was given clear discharge and follow-up instructions including return to the ER immediately for worsening concerns. The patient has been advised to follow-up with their primary care physician and/or referred physician in the next two to three days or sooner if worsening and to return to the ER immediately as above with any concerns. I provided the patient counseling with regard to my customary list of strict return precautions as well as return precautions specific to the cause for today's emergency department visit.  The patient will return under these provided conditions, but should also return for new concerns or further worsening. Pt and/or family understand and agree with plan. Clinical Impression:  1. Bronchitis        Disposition referral (if applicable):  Familia Metzger PA-C  [address] [facility]  [address] [place] [address]  [phone]    Schedule an appointment as soon as possible for a visit       [address]  [phone]    If symptoms worsen      Disposition medications (if applicable):  New Prescriptions    DEXTROMETHORPHAN-GUAIFENESIN (TUSSIN DM)  MG/5ML SYRP    Take 5 mLs by mouth every 4 hours as needed for Cough    PREDNISONE (DELTASONE) 50 MG TABLET    Take 1 tablet by mouth daily for 5 days       ED Provider Disposition Time  DISPOSITION Decision To Discharge 05/11/2022 03:50:54 PM          Electronically signed by: Danial Cazares M.D., 5/11/2022 3:56 PM      This dictation was created with voice recognition software. While attempts have been made to review the dictation as it is transcribed, on occasion the spoken word can be misinterpreted by the technology leading to omissions or inappropriate words, phrases or sentences.         Lukasz Aragon MD  05/11/22 [id number]

## 2022-05-11 NOTE — ED NOTES
The patient presents to the er today with complaints of a cough and wheezing for the past 4 days. She reports of a history of asthma and uses an inhaler. She does have a non productive cough during triage.         Evangelist Steinberg RN  05/11/22 [id number]

## 2022-05-11 NOTE — ED NOTES
The patient ambulated to X ray and back with the tech.                         Ron Johns RN  05/11/22 3134

## 2022-05-11 NOTE — ED NOTES
Discharge instructions and prescriptions were reviewed and the patient will follow up with the PCP. The patient voiced understanding of these instructions.                 Harsh Pathak RN  05/11/22 530

## 2022-05-17 ENCOUNTER — OFFICE VISIT (OUTPATIENT)
Dept: INTERNAL MEDICINE CLINIC | Age: 67
End: 2022-05-17
Payer: MEDICARE

## 2022-05-17 VITALS
WEIGHT: 210 LBS | HEART RATE: 87 BPM | BODY MASS INDEX: 34.99 KG/M2 | SYSTOLIC BLOOD PRESSURE: 140 MMHG | OXYGEN SATURATION: 97 % | HEIGHT: 65 IN | DIASTOLIC BLOOD PRESSURE: 76 MMHG

## 2022-05-17 DIAGNOSIS — J40 BRONCHITIS: Primary | ICD-10-CM

## 2022-05-17 PROCEDURE — 1123F ACP DISCUSS/DSCN MKR DOCD: CPT | Performed by: NURSE PRACTITIONER

## 2022-05-17 PROCEDURE — 4040F PNEUMOC VAC/ADMIN/RCVD: CPT | Performed by: NURSE PRACTITIONER

## 2022-05-17 PROCEDURE — G8417 CALC BMI ABV UP PARAM F/U: HCPCS | Performed by: NURSE PRACTITIONER

## 2022-05-17 PROCEDURE — G8399 PT W/DXA RESULTS DOCUMENT: HCPCS | Performed by: NURSE PRACTITIONER

## 2022-05-17 PROCEDURE — G8427 DOCREV CUR MEDS BY ELIG CLIN: HCPCS | Performed by: NURSE PRACTITIONER

## 2022-05-17 PROCEDURE — 3017F COLORECTAL CA SCREEN DOC REV: CPT | Performed by: NURSE PRACTITIONER

## 2022-05-17 PROCEDURE — 99213 OFFICE O/P EST LOW 20 MIN: CPT | Performed by: NURSE PRACTITIONER

## 2022-05-17 PROCEDURE — 1036F TOBACCO NON-USER: CPT | Performed by: NURSE PRACTITIONER

## 2022-05-17 PROCEDURE — 1090F PRES/ABSN URINE INCON ASSESS: CPT | Performed by: NURSE PRACTITIONER

## 2022-05-17 RX ORDER — PREDNISONE 10 MG/1
TABLET ORAL
Qty: 27 TABLET | Refills: 0 | Status: SHIPPED | OUTPATIENT
Start: 2022-05-17 | End: 2022-05-26

## 2022-05-17 RX ORDER — AZITHROMYCIN 250 MG/1
TABLET, FILM COATED ORAL
Qty: 1 PACKET | Refills: 0 | Status: SHIPPED | OUTPATIENT
Start: 2022-05-17 | End: 2022-07-14

## 2022-05-17 ASSESSMENT — ENCOUNTER SYMPTOMS
SHORTNESS OF BREATH: 1
RHINORRHEA: 0
DIARRHEA: 0
NAUSEA: 0
COUGH: 1
SINUS PRESSURE: 0
SORE THROAT: 0
VOMITING: 0
HEARTBURN: 0
HEMOPTYSIS: 0
SINUS PAIN: 0
CHEST TIGHTNESS: 1
WHEEZING: 1

## 2022-05-17 NOTE — PROGRESS NOTES
Isela Printers   79 y.o.  female  [id number]      Chief Complaint   Patient presents with    Other     follow up from ER        Subjective:  79 y. o.female is here for a follow up. She has the following chronic/acute medical problems:  Patient Active Problem List   Diagnosis    Mild intermittent asthma without complication    Facial lesion    Other fatigue    Abnormal ECG    Palpitations    Class 2 obesity due to excess calories in adult    Vitamin D deficiency    Prediabetes    Age-related osteoporosis without current pathological fracture    Mixed hyperlipidemia    Pain of left heel       Patient is here for a follow up from the emergency room. Patient had went to the ER on 5/11/2022. Patient had complaints of nasal congestion, runny nose, productive cough and wheezing. Patient had a chest xray done that showed mild central bronchial thickening which may be related to reactive airway disease without focal suspicious infiltrate. Patient was discharged with bronchitis. Patient was prescribed prednisone 50 mg daily x 5 day and Tussin DM. Patient states she has a albuterol inhaler she has been using at home. Patient states she is improving but continue with some coughing, SOB, and wheezing. Patient states these symptoms are better but keep hanging on. Cough  This is a new problem. The current episode started 1 to 4 weeks ago (10 days ago). The problem has been gradually improving. Episode frequency: cough has improved. The cough is non-productive. Associated symptoms include shortness of breath and wheezing. Pertinent negatives include no chest pain, chills, ear congestion, ear pain, fever, headaches, heartburn, hemoptysis, myalgias, nasal congestion, postnasal drip, rash, rhinorrhea, sore throat, sweats or weight loss. Nothing aggravates the symptoms. Treatments tried: prednisone 5 days finished - tussin DM and albuterol inhaler. The treatment provided mild relief.  Her past medical history is significant for asthma (states she was told she did). Review of Systems   Constitutional: Negative for appetite change, chills, fatigue, fever and weight loss. HENT: Negative for congestion, ear pain, postnasal drip, rhinorrhea, sinus pressure, sinus pain, sneezing and sore throat. Respiratory: Positive for cough, chest tightness, shortness of breath and wheezing. Negative for hemoptysis. Cardiovascular: Negative for chest pain and palpitations. Gastrointestinal: Negative for diarrhea, heartburn, nausea and vomiting. Musculoskeletal: Negative for myalgias. Skin: Negative for rash. Neurological: Negative for dizziness, light-headedness and headaches. Current Outpatient Medications   Medication Sig Dispense Refill    azithromycin (ZITHROMAX) 250 MG tablet Take 2 tabs (500 mg) on Day 1, and take 1 tab (250 mg) on days 2 through 5. 1 packet 0    predniSONE (DELTASONE) 10 MG tablet Take 4 tablets by mouth daily for 5 days, THEN 3 tablets daily for 1 day, THEN 2 tablets daily for 1 day, THEN 1 tablet daily for 1 day, THEN 0.5 tablets daily for 1 day. 27 tablet 0    Dextromethorphan-guaiFENesin (TUSSIN DM)  MG/5ML SYRP Take 5 mLs by mouth every 4 hours as needed for Cough 120 mL 0    Multiple Vitamins-Minerals (VITEYES AREDS FORMULA PO) Take by mouth      ibandronate (BONIVA) 150 MG tablet Take 1 tablet by mouth every 30 days Take one (1) tablet once per month in the morning with a full glass of water, on an empty stomach, and do not take anything else by mouth or lie down for the next 30 minutes. 1 tablet 11    albuterol sulfate  (90 Base) MCG/ACT inhaler Inhale 2 puffs into the lungs every 6 hours as needed for Wheezing 1 g 5    VITAMIN D PO Take by mouth daily       Red Yeast Rice Extract (RED YEAST RICE PO) Take by mouth        No current facility-administered medications for this visit. Past medical, family,surgical history reviewed today.      Objective:  BP (!) 140/76   Pulse 87   Ht 5' 5\" (1.651 m)   Wt 210 lb (95.3 kg)   SpO2 97%   BMI 34.95 kg/m²   BP Readings from Last 3 Encounters:   05/17/22 (!) 140/76   05/11/22 (!) 178/73   03/01/22 124/78     Wt Readings from Last 3 Encounters:   05/17/22 210 lb (95.3 kg)   05/11/22 210 lb (95.3 kg)   03/01/22 205 lb 12.8 oz (93.4 kg)         Physical Exam  Constitutional:       Appearance: Normal appearance. HENT:      Head: Normocephalic. Right Ear: Tympanic membrane, ear canal and external ear normal.      Left Ear: Tympanic membrane, ear canal and external ear normal.      Nose: Nose normal.      Mouth/Throat:      Lips: Pink. Mouth: Mucous membranes are moist.      Pharynx: Oropharynx is clear. Cardiovascular:      Rate and Rhythm: Normal rate and regular rhythm. Heart sounds: Normal heart sounds. Pulmonary:      Effort: Pulmonary effort is normal.      Breath sounds: Wheezing (faint scattered) present. Musculoskeletal:      Cervical back: Neck supple. Skin:     General: Skin is warm and dry. Neurological:      Mental Status: She is alert and oriented to person, place, and time.    Psychiatric:         Mood and Affect: Mood normal.         Behavior: Behavior normal.         Lab Results   Component Value Date    WBC 5.5 01/18/2021    HGB 12.2 01/18/2021    HCT 37.3 01/18/2021    MCV 85.0 01/18/2021     01/18/2021     Lab Results   Component Value Date     10/21/2021    K 4.6 10/21/2021     10/21/2021    CO2 22 10/21/2021    BUN 15 10/21/2021    CREATININE 0.6 10/21/2021    GLUCOSE 142 (H) 10/21/2021    CALCIUM 9.1 10/21/2021    PROT 6.9 10/21/2021    LABALBU 4.3 10/21/2021    BILITOT 0.3 10/21/2021    ALKPHOS 83 10/21/2021    AST 20 10/21/2021    ALT 21 10/21/2021    LABGLOM >60 10/21/2021    GFRAA >60 10/21/2021    AGRATIO 1.7 10/21/2021    GLOB 2.6 10/21/2021     Lab Results   Component Value Date    CHOL 211 (H) 01/20/2021    CHOL 178 03/30/2012     Lab Results   Component Value Date    TRIG 132 01/20/2021    TRIG 152 (H) 03/30/2012     Lab Results   Component Value Date    HDL 47 10/21/2021    HDL 45 01/20/2021    HDL 48 (L) 03/30/2012     Lab Results   Component Value Date    LDLCALC 127 (H) 10/21/2021     Lab Results   Component Value Date    LABA1C 6.3 03/01/2022     Lab Results   Component Value Date    TSHFT4 3.24 01/18/2021    TSHHS 2.871 03/30/2012         ASSESSMENT/PLAN:      1. Bronchitis  Continue Tussin DM 5 ml Q 4 PRN for cough. Continue albuterol inhaler 2 puffs Q 6 PRN for SOB and wheezing. Advised to take medications as prescribed. Encourage clear fluids without caffeine  - Smaller, more frequent meals to ensure hydration.   - Saline nasal spray, cool mist humidifier, prop head at night for congestion.   - May use spoonfuls of honey to coat throat.    - Tylenol as needed for fever, pain.    - Counseled on signs of increased work of breathing.   - RTO if sxs increase or no improvement  - azithromycin (ZITHROMAX) 250 MG tablet; Take 2 tabs (500 mg) on Day 1, and take 1 tab (250 mg) on days 2 through 5. Dispense: 1 packet; Refill: 0  - predniSONE (DELTASONE) 10 MG tablet; Take 4 tablets by mouth daily for 5 days, THEN 3 tablets daily for 1 day, THEN 2 tablets daily for 1 day, THEN 1 tablet daily for 1 day, THEN 0.5 tablets daily for 1 day. Dispense: 27 tablet; Refill: 0          There are no discontinued medications. No orders of the defined types were placed in this encounter. Care discussed with patient. Questions answered. Patient verbalizes understanding and agrees with plan. After visit summary provided. Advised to call for any problems, questions, or concerns. Return if symptoms worsen or fail to improve.                                              Signed:  Mardelle Hatchet, APRN - CNP  05/17/22  9:19 AM

## 2022-07-14 ENCOUNTER — OFFICE VISIT (OUTPATIENT)
Dept: FAMILY MEDICINE CLINIC | Age: 67
End: 2022-07-14
Payer: MEDICARE

## 2022-07-14 VITALS
BODY MASS INDEX: 36.23 KG/M2 | SYSTOLIC BLOOD PRESSURE: 160 MMHG | WEIGHT: 212.2 LBS | TEMPERATURE: 98.2 F | HEART RATE: 76 BPM | HEIGHT: 64 IN | RESPIRATION RATE: 15 BRPM | DIASTOLIC BLOOD PRESSURE: 84 MMHG | OXYGEN SATURATION: 96 %

## 2022-07-14 DIAGNOSIS — Z00.00 MEDICARE ANNUAL WELLNESS VISIT, SUBSEQUENT: Primary | ICD-10-CM

## 2022-07-14 PROCEDURE — 3017F COLORECTAL CA SCREEN DOC REV: CPT | Performed by: PHYSICIAN ASSISTANT

## 2022-07-14 PROCEDURE — 1123F ACP DISCUSS/DSCN MKR DOCD: CPT | Performed by: PHYSICIAN ASSISTANT

## 2022-07-14 PROCEDURE — G0439 PPPS, SUBSEQ VISIT: HCPCS | Performed by: PHYSICIAN ASSISTANT

## 2022-07-14 ASSESSMENT — PATIENT HEALTH QUESTIONNAIRE - PHQ9
SUM OF ALL RESPONSES TO PHQ9 QUESTIONS 1 & 2: 0
SUM OF ALL RESPONSES TO PHQ QUESTIONS 1-9: 0
1. LITTLE INTEREST OR PLEASURE IN DOING THINGS: 0
SUM OF ALL RESPONSES TO PHQ QUESTIONS 1-9: 0
2. FEELING DOWN, DEPRESSED OR HOPELESS: 0

## 2022-07-14 NOTE — PROGRESS NOTES
Good  In the past 7 days, have you experienced any of the following: New or Increased Pain, New or Increased Fatigue, Loneliness, Social Isolation, Stress or Anger?: No  Do you get the social and emotional support that you need?: Yes  Do you have a Living Will?: (!) No    Advance Directives     Power of [address] Will ACP-Advance Directive ACP-Power of     Not on File Not on File Not on File Not on File      General Health Risk Interventions:  · No Living Will: Patient declines ACP discussion/assistance    Health Habits/Nutrition:     Physical Activity: Inactive    Days of Exercise per Week: 0 days    Minutes of Exercise per Session: 0 min     Have you lost any weight without trying in the past 3 months?: No  Body mass index: (!) 36.42  Have you seen the dentist within the past year?: (!) No    Health Habits/Nutrition Interventions:  · Dental exam overdue:  patient encouraged to make appointment with his/her dentist             Objective   Vitals:    07/14/22 1430   BP: (!) 160/84   Site: Left Upper Arm   Position: Sitting   Cuff Size: Medium Adult   Pulse: 76   Resp: 15   Temp: 98.2 °F (36.8 °C)   TempSrc: Temporal   SpO2: 96%   Weight: 212 lb 3.2 oz (96.3 kg)   Height: 5' 4\" (1.626 m)      Body mass index is 36.42 kg/m². General Appearance: alert and oriented to person, place and time, well-developed and well-nourished, in no acute distress  Pulmonary/Chest: clear to auscultation bilaterally- no wheezes, rales or rhonchi, normal air movement, no respiratory distress  Cardiovascular: normal rate, normal S1 and S2, no gallops, intact distal pulses and no carotid bruits  Extremities: no cyanosis and no clubbing  Musculoskeletal: normal range of motion, no joint swelling, deformity or tenderness  Neurologic: gait and coordination normal and speech normal       Allergies   Allergen Reactions    Aleve [Naproxen] Hives     Prior to Visit Medications    Medication Sig Taking?  Authorizing Provider Cholecalciferol (VITAMIN D3) 1.25 MG (36672 UT) TABS Take by mouth Yes Historical Provider, MD   UNABLE TO FIND A reds eye drops Yes Historical Provider, MD   Multiple Vitamins-Minerals (VITEYES AREDS FORMULA PO) Take by mouth Yes Historical Provider, MD   albuterol sulfate  (90 Base) MCG/ACT inhaler Inhale 2 puffs into the lungs every 6 hours as needed for Wheezing Yes Jessica Lopez PA-C   Red Yeast Rice Extract (RED YEAST RICE PO) Take by mouth  Yes Historical Provider, MD   VITAMIN D PO Take by mouth daily   Patient not taking: Reported on 7/14/2022  Historical Provider, MD Johnson (Including outside providers/suppliers regularly involved in providing care):   Patient Care Team:  Jessica Lopez PA-C as PCP - General (Physician Assistant)  Jessica Lopez PA-C as PCP - [facility] Empaneled Provider     Reviewed and updated this visit:  Tobacco  Allergies  Meds  Problems  Med Hx  Surg Hx  Soc Hx  Fam Hx

## 2022-07-14 NOTE — PATIENT INSTRUCTIONS
afraid of having pain, losing your independence, or being kept alive by machines.)   Where would you prefer to die? (Your home? A hospital? A nursing home?)   Do you want to donate your organs when you die?  Do you want certain Shinto practices performed before you die? When should you call for help? Be sure to contact your doctor if you have any questions. Where can you learn more? Go to [email or web address] and sign in to your Brayola account. Enter R264 in the [employer] box to learn more about \"Advance Directives: Care Instructions. \"     If you do not have an account, please click on the \"Sign Up Now\" link. Current as of: October 18, 2021               Content Version: 13.3  © 2006-2022 Rapid[employer]. Care instructions adapted under license by [facility] ([facility]). If you have questions about a medical condition or this instruction, always ask your healthcare professional. [place] [address] any warranty or liability for your use of this information. Learning About Medical Power of   What is a medical power of ? A medical power of , also called a durable power of  for health care, is one type of the legal forms called advance directives. It lets you name the person you want to make treatment decisions for you if you can't speak or decide for yourself. The person you choose is called your health care agent. This person is also called a health care proxy or health care surrogate. A medical power of  may be called something else in your state. How do you choose a health care agent? Choose your health care agent carefully. This person may or may not be a familymember. Talk to the person before you make your final decision. Make sure he or she iscomfortable with this responsibility. It's a good idea to choose someone who:   Is at least 25years old.    Knows you well and understands what makes life meaningful for you.  Understands your Confucianist and moral values.  Will do what you want, not what he or she wants.  Will be able to make difficult choices at a stressful time.  Will be able to refuse or stop treatment, if that is what you would want, even if you could die.  Will be firm and confident with health professionals if needed.  Will ask questions to get needed information.  Lives near you or agrees to travel to you if needed. Your family may help you make medical decisions while you can still be part of that process. But it's important to choose one person to be your health careagent in case you aren't able to make decisions for yourself. If you don't fill out the legal form and name a health care agent, thedecisions your family can make may be limited. A health care agent may be called something else in your state. Who will make decisions for you if you don't have a health care agent? If you don't have a health care agent or a living will, you may not get the care you want. Decisions may be made by family members who disagree about your medical care. Or decisions may be made by a medical professional who doesn'tknow you well. In some cases, a  makes the decisions. When you name a health care agent, it is very clear who has the power to [place] decisions for you. How do you name a health care agent? You name your health care agent on a legal form. This form is usually called a medical power of . Ask your hospital, state bar association, or officeon aging where to find these forms. You must sign the form to make it legal. Some states require you to get the form notarized. This means that a person called a  watches you sign the form and then he or she signs the form. Some states also require thattwo or more witnesses sign the form. Be sure to tell your family members and doctors who your health care agent is. Where can you learn more?   Go to [email or web address] and sign in to your Blackstar [employer]ification account. Enter [id number] in the [facility] box to learn more about \"Learning About [address]. \"     If you do not have an account, please click on the \"Sign Up Now\" link. Current as of: October 18, 2021               Content Version: 13.3  © 2006-2022 Healthwise, salgomed. Care instructions adapted under license by [facility] ([facility]). If you have questions about a medical condition or this instruction, always ask your healthcare professional. [address] any warranty or liability for your use of this information. Learning About Living Perroy  What is a living will? A living will, also called a declaration, is a legal form. It tells your family and your doctor your wishes when you can't speak for yourself. It's used by the health professionals who will treat you as you near the end of your life or ifyou get seriously hurt or ill. If you put your wishes in writing, your loved ones and others will know what kind of care you want. They won't need to guess. This can ease your mind and behelpful to others. And you can change or cancel your living will at any time. A living will is not the same as an estate or property will. An estate willexplains what you want to happen with your money and property after you die. How do you use it? Keep these facts in mind about how a living will is used.  Your living will is used only if you can't speak or make decisions for yourself. Most often, one or more doctors must certify that you can't speak or decide for yourself before your living will takes effect.  If you get better and can speak for yourself again, you can accept or refuse any treatment. It doesn't matter what you said in your living will.  Some states may limit your right to refuse treatment in certain cases.  For example, you may need to clearly state in your living will that you don't want artificial hydration and nutrition, such as being fed through a tube. Is a living will a legal document? A living will is a legal document. Each state has its own laws about livingwills. And a living will may be called something else in your state. Here are some things to know about living tran.  You don't need an  to complete a living will. But legal advice can be helpful if your state's laws are unclear. It can also help if your health history is complicated or your family can't agree on what should be in your living will.  You can change your living will at any time. Some people find that their wishes about end-of-life care change as their health changes. If you make big changes to your living will, complete a new form.  If you move to another state, make sure that your living will is legal in the state where you now live. In most cases, doctors will respect your wishes even if you have a form from a different state.  You might use a universal form that has been approved by many states. This kind of form can sometimes be filled out and stored online. Your digital copy will then be available wherever you have a connection to the internet. The doctors and nurses who need to treat you can find it right away.  Your state may offer an online registry. This is another place where you can store your living will online.  It's a good idea to get your living will notarized. This means using a person called a  to watch two people sign, or witness, your living will. What should you know when you create a living will? Here are some questions to ask yourself as you make your living will.  Do you know enough about life support methods that might be used? If not, talk to your doctor so you know what might be done if you can't breathe on your own, your heart stops, or you can't swallow.  What things would you still want to be able to do after you receive life-support methods?  Would you want to be able to walk? To speak? To eat on your own? To live without the help of machines?  Do you want certain Quaker practices performed if you become very ill?  If you have a choice, where do you want to be cared for? In your home? At a hospital or nursing home?  If you have a choice at the end of your life, where would you prefer to die? At home? In a hospital or nursing home? Somewhere else?  Would you prefer to be buried or cremated?  Do you want your organs to be donated after you die? What should you do with your living will?  Make sure that your family members and your health care agent have copies of your living will (also called a declaration).  Give your doctor a copy of your living will. Ask to have it kept as part of your medical record. If you have more than one doctor, make sure that each one has a copy.  Put a copy of your living will where it can be easily found. For example, some people may put a copy on their refrigerator door. If you are using a digital copy, be sure your doctor, family members, and health care agent know how to find and access it. Where can you learn more? Go to [email or web address] and sign in to your Websense account. Enter Y722 in the Oink box to learn more about \"Learning About Living Perroshirin. \"     If you do not have an account, please click on the \"Sign Up Now\" link. Current as of: October 18, 2021               Content Version: 13.3  © 2006-2022 Healthwise, [employer]. Care instructions adapted under license by [facility] ([facility]). If you have questions about a medical condition or this instruction, always ask your healthcare professional. [place] [address] any warranty or liability for your use of this information. DASH Diet: Care Instructions  Your Care Instructions     The DASH diet is an eating plan that can help lower your blood pressure.  DASH stands for Dietary Approaches to Stop Hypertension. Hypertension is high bloodpressure. The DASH diet focuses on eating foods that are high in calcium, potassium, and magnesium. These nutrients can lower blood pressure. The foods that are highest in these nutrients are fruits, vegetables, low-fat dairy products, nuts, seeds, and legumes. But taking calcium, potassium, and magnesium supplements instead of eating foods that are high in those nutrients does not have the same effect. The DASH diet also includes whole grains, fish, and poultry. The DASH diet is one of several lifestyle changes your doctor may recommend to lower your high blood pressure. Your doctor may also want you to decrease the amount of sodium in your diet. Lowering sodium while following the DASH dietcan lower blood pressure even further than just the DASH diet alone. Follow-up care is a key part of your treatment and safety. Be sure to make and go to all appointments, and call your doctor if you are having problems. It's also a good idea to know your test results and keep alist of the medicines you take. How can you care for yourself at home? Following the DASH diet   Eat 4 to 5 servings of fruit each day. A serving is 1 medium-sized piece of fruit, ½ cup chopped or canned fruit, 1/4 cup dried fruit, or 4 ounces (½ cup) of fruit juice. Choose fruit more often than fruit juice.  Eat 4 to 5 servings of vegetables each day. A serving is 1 cup of lettuce or raw leafy vegetables, ½ cup of chopped or cooked vegetables, or 4 ounces (½ cup) of vegetable juice. Choose vegetables more often than vegetable juice.  Get 2 to 3 servings of low-fat and fat-free dairy each day. A serving is 8 ounces of milk, 1 cup of yogurt, or 1 ½ ounces of cheese.  Eat 6 to 8 servings of grains each day. A serving is 1 slice of bread, 1 ounce of dry cereal, or ½ cup of cooked rice, pasta, or cooked cereal. Try to choose whole-grain products as much as possible.    Limit lean meat, poultry, and fish to 2 servings each day. A serving is 3 ounces, about the size of a deck of cards.  Eat 4 to 5 servings of nuts, seeds, and legumes (cooked dried beans, lentils, and split peas) each week. A serving is 1/3 cup of nuts, 2 tablespoons of seeds, or ½ cup of cooked beans or peas.  Limit fats and oils to 2 to 3 servings each day. A serving is 1 teaspoon of vegetable oil or 2 tablespoons of salad dressing.  Limit sweets and added sugars to 5 servings or less a week. A serving is 1 tablespoon jelly or jam, ½ cup sorbet, or 1 cup of lemonade.  Eat less than 2,300 milligrams (mg) of sodium a day. If you limit your sodium to 1,500 mg a day, you can lower your blood pressure even more.  Be aware that all of these are the suggested number of servings for people who eat 1,800 to 2,000 calories a day. Your recommended number of servings may be different if you need more or fewer calories. Tips for success   Start small. Do not try to make dramatic changes to your diet all at once. You might feel that you are missing out on your favorite foods and then be more likely to not follow the plan. Make small changes, and stick with them. Once those changes become habit, add a few more changes.  Try some of the following:  ? Make it a goal to eat a fruit or vegetable at every meal and at snacks. This will make it easy to get the recommended amount of fruits and vegetables each day. ? Try yogurt topped with fruit and nuts for a snack or healthy dessert. ? Add lettuce, tomato, cucumber, and onion to sandwiches. ? Combine a ready-made pizza crust with low-fat mozzarella cheese and lots of vegetable toppings. Try using tomatoes, squash, spinach, broccoli, carrots, cauliflower, and onions. ? Have a variety of cut-up vegetables with a low-fat dip as an appetizer instead of chips and dip. ? Sprinkle sunflower seeds or chopped almonds over salads. Or try adding chopped walnuts or almonds to cooked vegetables.   ? Try some vegetarian meals using beans and peas. Add garbanzo or kidney beans to salads. Make burritos and tacos with mashed lr beans or black beans. Where can you learn more? Go to [email or web address]://vanessa.[email or web address] org and sign in to your Amootoon account. Enter O995 in the [facility] box to learn more about \"DASH Diet: Care Instructions. \"     If you do not have an account, please click on the \"Sign Up Now\" link. Current as of: January 10, 2022               Content Version: 13.3  © 2006-2022 [employer]. Care instructions adapted under license by [facility] ([facility]). If you have questions about a medical condition or this instruction, always ask your healthcare professional. [address] any warranty or liability for your use of this information. Learning About Cutting Calories  How do calories affect your weight? Food gives your body energy. Energy from the food you eat is measured in calories. This energy keeps your heart beating, your brain active, and yourmuscles working. Your body needs a certain number of calories each day. After your body uses thecalories it needs, it stores extra calories as fat. To lose weight safely, you have to eat fewer calories while eating in a healthyway. How many calories do you need each day? The more active you are, the more calories you need. When you are less active, you need fewer calories. How many calories you need each day also depends onseveral things, including your age and whether you are male or female. Here are some general guidelines for adults:   Less active women and older adults need 1,600 to 2,000 calories each day.  Active women and less active men need 2,000 to 2,400 calories each day.  Active men need 2,400 to 3,000 calories each day. How can you cut calories and eat healthy meals? Whole grains, vegetables and fruits, and dried beans are good lower-caloriefoods.  They give you lots of nutrients and fiber. And they fill you up. Sweets, energy drinks, and soda pop are high in calories. They give you few nutrients and no fiber. Try to limit soda pop, fruit juice, and energy drinks. Drink water instead. Some fats can be part of a healthy diet. But cutting back on fats from highly processed foods like fast foods and many snack foods is a good way to lower the calories in your diet. Also, use smaller amounts of fats like butter, margarine, salad dressing, and mayonnaise. Add fresh garlic, lemon, or herbs toyour meals to add flavor without adding fat. Meats and dairy products can be a big source of hidden fats. Try to choose leanor low-fat versions of these products. Fat-free cookies, candies, chips, and frozen treats can still be high in sugar and calories. Some fat-free foods have more calories than regular ones. Eatfat-free treats in moderation, as you would other foods. If your favorite foods are high in fat, salt, sugar, or calories, limit how often you eat them. Eat smaller servings, or look for healthy substitutes. Fillup on fruits, vegetables, and whole grains. Eating at home   Use meat as a side dish instead of as the main part of your meal.   Try main dishes that use whole wheat pasta, brown rice, dried beans, or vegetables.  Find ways to cook with little or no fat, such as broiling, steaming, or grilling.  Use cooking spray instead of oil. If you use oil, use a monounsaturated oil, such as canola or olive oil.  Trim fat from meats before you cook them.  Drain off fat after you brown the meat or while you roast it.  Chill soups and stews after you cook them. Then skim the fat off the top after it hardens. Eating out   Order foods that are broiled or poached rather than fried or breaded.  Cut back on the amount of butter or margarine that you use on bread.  Order sauces, gravies, and salad dressings on the side, and use only a little.    When you order pasta, choose tomato sauce rather than cream sauce.  Ask for salsa with your baked potato instead of sour cream, butter, cheese, or gould.  Order meals in a small size instead of upgrading to a large.  Share an entree, or take part of your food home to eat as another meal.   Share appetizers and desserts. Where can you learn more? Go to [email or web address] and sign in to your METRIXWARE account. Enter K648 in the [email or web address].com box to learn more about \"Learning About Cutting Calories. \"     If you do not have an account, please click on the \"Sign Up Now\" link. Current as of: September 8, 2021               Content Version: 13.3  © 2006-2022 Healthwise, [employer]. Care instructions adapted under license by [facility] ([facility]). If you have questions about a medical condition or this instruction, always ask your healthcare professional. [address] any warranty or liability for your use of this information. Learning About Healthy Weight  What is a healthy weight? A healthy weight is the weight at which you feel good about yourself and have energy for work and play. It's also one that lowers your risk for healthproblems. What can you do to stay at a healthy weight? It can be hard to stay at a healthy weight, especially when fast food, vending-machine snacks, and processed foods are so easy to find. And with your busy lifestyle, activity may be low on your list of things to do. But stayingat a healthy weight may be easier than you think. Here are some dos and don'ts for staying at a healthy weight. Do eat healthy foods  The kinds of foods you eat have a big impact on both your weight and your health. Reaching and staying at a healthy weight is not about going on a diet. It's about making healthier food choices every day and changing your diet forgood. Healthy eating means eating a variety of foods so that you get all the nutrients you need.  Your body needs protein, carbohydrate, when you're at rest. Being active helps you lose fat andbuild lean muscle. Try to be active for at least 1 hour every day. This may sound like a lot, but it's okay to be active in smaller blocks of time that add up to 1 hour a day. Any activity that makes your heart beat faster and keeps it there for a while counts. A brisk walk, run, or swim will get your heart beating faster. So will climbing stairs, shooting baskets, or cycling. Even some household chores likevacuuming and mowing the lawn will get your heart rate up. Pick activities that you enjoy--ones that make your heart beat faster, your muscles stronger, and your muscles and joints more flexible. If you find more than one thing you like doing, do them all. You don't have to do the same thingevery day. Don't diet  Diets don't work. Diets are temporary. Because you give up so much when you diet, you may be hungry and think about food all the time. And after you stop dieting, you also may overeat to make up for what you missed. Most people who diet end up gainingback the pounds they lost--and more. Remember that healthy bodies come in lots of shapes and sizes. Everyone can gethealthier by eating better and being more active. Where can you learn more? Go to [email or web address] and sign in to your Pliant Technology account. Enter [id number] in the [facility] box to learn more about \"Learning About Healthy Weight. \"     If you do not have an account, please click on the \"Sign Up Now\" link. Current as of: December 27, 2021               Content Version: 13.3  © 5981-8074 GiveForward. Care instructions adapted under license by [facility] ([facility]). If you have questions about a medical condition or this instruction, always ask your healthcare professional. [address] any warranty or liability for your use of this information. Learning About Low-Carbohydrate Diets  What is a low-carbohydrate diet? beans, such as chicken, fish, lean meat, beans, nuts, and seeds. One egg, 1 tablespoon of peanut butter, ½ ounce nuts or seeds, or ¼ cup of cooked beans equals 1 ounce of meat.  Learn how to read food labels for serving sizes and ingredients. Fast-food and convenience-food meals often contain few or no fruits or vegetables. Make sure you eat some fruits and vegetables to make the meal more nutritious.  Look at your food diary. For each food group, add up what you have eaten and then divide the total by the number of days. This will give you an idea of how much you are eating from each food group. See if you can find some ways to change your diet to make it more healthy. Start small   Do not try to make dramatic changes to your diet all at once. You might feel that you are missing out on your favorite foods and then be more likely to fail.  Start slowly, and gradually change your habits. Try some of the following:  ? Use whole wheat bread instead of white bread. ? Use nonfat or low-fat milk instead of whole milk. ? Eat brown rice instead of white rice, and eat whole wheat pasta instead of white-flour pasta. ? Try low-fat cheeses and low-fat yogurt. ? Add more fruits and vegetables to meals and have them for snacks. ? Add lettuce, tomato, cucumber, and onion to sandwiches. ? Add fruit to yogurt and cereal.  Enjoy food   You can still eat your favorite foods. You just may need to eat less of them. If your favorite foods are high in fat, salt, and sugar, limit how often you eat them, but do not cut them out entirely.  Eat a wide variety of foods. Make healthy choices when eating out   The type of restaurant you choose can help you make healthy choices. Even fast-food chains are now offering more low-fat or healthier choices on the menu.  Choose smaller portions, or take half of your meal home.  When eating out, try:  ?  A veggie pizza with a whole wheat crust or grilled chicken (instead of sausage or pepperoni). ? Pasta with roasted vegetables, grilled chicken, or marinara sauce instead of cream sauce. ? A vegetable wrap or grilled chicken wrap. ? Broiled or poached food instead of fried or breaded items. Make healthy choices easy   Buy packaged, prewashed, ready-to-eat fresh vegetables and fruits, such as baby carrots, salad mixes, and chopped or shredded broccoli and cauliflower.  Buy packaged, presliced fruits, such as melon or pineapple.  Choose 100% fruit or vegetable juice instead of soda. Limit juice intake to 4 to 6 oz (½ to ¾ cup) a day.  Blend low-fat yogurt, fruit juice, and canned or frozen fruit to make a smoothie for breakfast or a snack. Where can you learn more? Go to [email or web address] and sign in to your [employer] account. Enter F566 in the Active[employer] box to learn more about \"Eating Healthy Foods: Care Instructions. \"     If you do not have an account, please click on the \"Sign Up Now\" link. Current as of: September 8, 2021               Content Version: 13.3  © 7230-6048 Healthwise, [employer] Care instructions adapted under license by [facility] ([facility]). If you have questions about a medical condition or this instruction, always ask your healthcare professional. [place] [address] any warranty or liability for your use of this information. Personalized Preventive Plan for Arbutus Golden - 7/14/2022  Medicare offers a range of preventive health benefits. Some of the tests and screenings are paid in full while other may be subject to a deductible, co-insurance, and/or copay. Some of these benefits include a comprehensive review of your medical history including lifestyle, illnesses that may run in your family, and various assessments and screenings as appropriate.     After reviewing your medical record and screening and assessments performed today your provider may have ordered immunizations, labs, imaging, and/or referrals for you. A list of these orders (if applicable) as well as your Preventive Care list are included within your After Visit Summary for your review. Other Preventive Recommendations:    · A preventive eye exam performed by an eye specialist is recommended every 1-2 years to screen for glaucoma; cataracts, macular degeneration, and other eye disorders. · A preventive dental visit is recommended every 6 months. · Try to get at least 150 minutes of exercise per week or 10,000 steps per day on a pedometer . · Order or download the FREE \"Exercise & Physical Activity: Your Everyday Guide\" from [employer]. Call 5-418.795.4354 or search [employer] online. · You need 7988-2061 mg of calcium and 0740-9228 IU of vitamin D per day. It is possible to meet your calcium requirement with diet alone, but a vitamin D supplement is usually necessary to meet this goal.  · When exposed to the sun, use a sunscreen that protects against both UVA and UVB radiation with an SPF of 30 or greater. Reapply every 2 to 3 hours or after sweating, drying off with a towel, or swimming. · Always wear a seat belt when traveling in a car. Always wear a helmet when riding a bicycle or motorcycle.

## 2022-08-13 PROBLEM — Z00.00 MEDICARE ANNUAL WELLNESS VISIT, SUBSEQUENT: Status: RESOLVED | Noted: 2022-07-14 | Resolved: 2022-08-13

## 2022-08-16 ENCOUNTER — OFFICE VISIT MH/BH (OUTPATIENT)
Dept: BARIATRICS/WEIGHT MGMT | Age: 67
End: 2022-08-16
Payer: MEDICARE

## 2022-08-16 VITALS
DIASTOLIC BLOOD PRESSURE: 82 MMHG | HEIGHT: 64 IN | HEART RATE: 71 BPM | OXYGEN SATURATION: 97 % | SYSTOLIC BLOOD PRESSURE: 132 MMHG | WEIGHT: 208.9 LBS | BODY MASS INDEX: 35.67 KG/M2

## 2022-08-16 DIAGNOSIS — Z79.899 MEDICATION MANAGEMENT: ICD-10-CM

## 2022-08-16 DIAGNOSIS — E66.9 OBESITY (BMI 30-39.9): Primary | ICD-10-CM

## 2022-08-16 PROCEDURE — 1123F ACP DISCUSS/DSCN MKR DOCD: CPT | Performed by: NURSE PRACTITIONER

## 2022-08-16 PROCEDURE — G8427 DOCREV CUR MEDS BY ELIG CLIN: HCPCS | Performed by: NURSE PRACTITIONER

## 2022-08-16 PROCEDURE — 1036F TOBACCO NON-USER: CPT | Performed by: NURSE PRACTITIONER

## 2022-08-16 PROCEDURE — 3017F COLORECTAL CA SCREEN DOC REV: CPT | Performed by: NURSE PRACTITIONER

## 2022-08-16 PROCEDURE — G8399 PT W/DXA RESULTS DOCUMENT: HCPCS | Performed by: NURSE PRACTITIONER

## 2022-08-16 PROCEDURE — 99204 OFFICE O/P NEW MOD 45 MIN: CPT | Performed by: NURSE PRACTITIONER

## 2022-08-16 PROCEDURE — G8417 CALC BMI ABV UP PARAM F/U: HCPCS | Performed by: NURSE PRACTITIONER

## 2022-08-16 PROCEDURE — 1090F PRES/ABSN URINE INCON ASSESS: CPT | Performed by: NURSE PRACTITIONER

## 2022-08-16 ASSESSMENT — ENCOUNTER SYMPTOMS
RESPIRATORY NEGATIVE: 1
EYES NEGATIVE: 1
GASTROINTESTINAL NEGATIVE: 1
ALLERGIC/IMMUNOLOGIC NEGATIVE: 1

## 2022-08-16 ASSESSMENT — PATIENT HEALTH QUESTIONNAIRE - PHQ9
SUM OF ALL RESPONSES TO PHQ QUESTIONS 1-9: 0
1. LITTLE INTEREST OR PLEASURE IN DOING THINGS: 0
SUM OF ALL RESPONSES TO PHQ QUESTIONS 1-9: 0
SUM OF ALL RESPONSES TO PHQ9 QUESTIONS 1 & 2: 0
2. FEELING DOWN, DEPRESSED OR HOPELESS: 0

## 2022-08-16 NOTE — PROGRESS NOTES
Chief Complaint   Patient presents with    Weight Management     NP- 1st mediation wm visit. Has bar cov. SUBJECTIVE:    HPI: Patient is here with complaints of weight gain and inability to lose weight per self. Inquiring about weight loss medications to assist with their weight loss goal.    Obesity with a BMI of Body mass index is 35.86 kg/m². [place] Varg[place] Patient has failed to lose 5% of body weight for several years. Any history of glaucoma? DENIES    Any history of drug abuse? DENIES    Any history of CAD, uncontrolled HTN, arrhythmias, stroke, or CHF? ? DENIES    Any history of hyperthyroidism? Denies    Any current or recent use of MAOIs? DENIES    Current dietary measures: not currently    Current exercise measures: not currently    I have reviewed the patient's(pertinent information to this visit) medical history, family history(scanned in  the [address] [place] [address] under \"patient questioner\"), social history and review of systems with the patient today in the office.           Past Surgical History:   Procedure Laterality Date    CHOLECYSTECTOMY      HYSTERECTOMY (CERVIX STATUS UNKNOWN)      OVARY REMOVAL      age 39       Past Medical History:   Diagnosis Date    Asthma     Basal cell carcinoma 04/15/2021    History of echocardiogram 01/27/2021    EF 55-60%, E/A reversal indterminate diastolic function, no significant valvular abnormalities, no pericardial effusion    History of exercise stress test 01/27/2021    Treadmill, normal    Muscle spasm 1/18/2021    Shortness of breath 1/18/2021       Family History   Problem Relation Age of Onset    Heart Surgery Mother     Hypertension Mother     Heart Surgery Father        Social History     Socioeconomic History    Marital status:      Spouse name: Not on file    Number of children: Not on file    Years of education: Not on file    Highest education level: Not on file   Occupational History    Not on file   Tobacco Use    Smoking status: Never    Smokeless tobacco: Never   Vaping Use    Vaping Use: Not on file   Substance and Sexual Activity    Alcohol use: Not Currently     Alcohol/week: 0.0 standard drinks    Drug use: Never    Sexual activity: Yes     Partners: Male   Other Topics Concern    Not on file   Social History Narrative    Not on file     Social Determinants of Health     Financial Resource Strain: Not on file   Food Insecurity: Not on file   Transportation Needs: Not on file   Physical Activity: Inactive    Days of Exercise per Week: 0 days    Minutes of Exercise per Session: 0 min   Stress: Not on file   Social Connections: Not on file   Intimate Partner Violence: Not on file   Housing Stability: Not on file       Current Outpatient Medications   Medication Sig Dispense Refill    Cholecalciferol (VITAMIN D3) 1.25 MG (79054 UT) TABS Take by mouth      UNABLE TO FIND A reds 2 (pill). Once daily. albuterol sulfate  (90 Base) MCG/ACT inhaler Inhale 2 puffs into the lungs every 6 hours as needed for Wheezing 1 g 5    Red Yeast Rice Extract (RED YEAST RICE PO) Take by mouth       Multiple Vitamins-Minerals (VITEYES AREDS FORMULA PO) Take by mouth (Patient not taking: Reported on 8/16/2022)      VITAMIN D PO Take by mouth daily  (Patient not taking: No sig reported)       No current facility-administered medications for this visit. Allergies   Allergen Reactions    Aleve [Naproxen] Hives       Review of Systems:         Review of Systems   Constitutional: Negative. HENT: Negative. Eyes: Negative. Respiratory: Negative. Cardiovascular: Negative. Gastrointestinal: Negative. Endocrine: Negative. Genitourinary: Negative. Musculoskeletal: Negative. Skin: Negative. Allergic/Immunologic: Negative. Neurological: Negative. Hematological: Negative. Psychiatric/Behavioral: Negative.          OBJECTIVE:  Physical Exam:    /82 (Site: Left Upper Arm, Position: Sitting, Cuff Size: Medium Adult)   Pulse 71 Ht 5' 4\" (1.626 m)   Wt 208 lb 14.4 oz (94.8 kg)   SpO2 97%   BMI 35.86 kg/m²      Physical Exam  Constitutional:       Appearance: Normal appearance. HENT:      Head: Normocephalic. Nose: Nose normal.      Mouth/Throat:      Pharynx: Oropharynx is clear. Eyes:      Conjunctiva/sclera: Conjunctivae normal.      Pupils: Pupils are equal, round, and reactive to light. Cardiovascular:      Rate and Rhythm: Normal rate. Pulses: Normal pulses. Pulmonary:      Effort: Pulmonary effort is normal.   Abdominal:      General: Bowel sounds are normal.   Musculoskeletal:         General: Normal range of motion. Cervical back: Normal range of motion. Skin:     General: Skin is warm and dry. Capillary Refill: Capillary refill takes less than 2 seconds. Neurological:      General: No focal deficit present. Mental Status: She is alert and oriented to person, place, and time. Psychiatric:         Mood and Affect: Mood normal.         Behavior: Behavior normal.         ASSESSMENT:  1. Obesity (BMI 30-39.9)  - Start tracking calories; about  9068-1814 daily  - 64 oz water daily  - Increase activity as able  - Referral RD    2. Medication management  - Will start Adipex with next visit if meets criteria     PLAN:    -Check EKG, UDS, CBC, and CMP prior to starting. Any abnormalities will be addressed prior to starting medication .    -Will plan on starting Adipex with next visit if Labs, UDS, and EKG WNL.     -BMI is over 30, or over 27 with weight-related risk factors.    -Pt aware Adipex can only be used short term (3 months). -Pt aware no breaks in treatment allowed or must be off for 6 months.    -Pt aware that weight must be lost at each visit or medication will be discontinued.     -Pt is aware that in order to be successful, must combine Adipex with appropriate diet and exercise plan.      -Pt provided with medication handout that covers use; side effects (common side effects include insomnia, dry mouth, constipation, nervousness, increased heart rate, hypertension); precautions; and interactions.     -Pt aware must meet monthly in person while on this medication.     -Check [place] Automated Rx Reporting System. Any concerns: NONE Reported     -Check urine pregnancy. Will need monthly pregnancy tests. Pt aware pregnancy and breast feeding contraindicated while using this medication. Current birth control measures include: hysterectomy    -If elderly or renal impairment, will use lower dose (15 mg daily) and avoid all together if GFR is less than 15. N/A       Orders Placed This Encounter   Procedures    Comprehensive Metabolic Panel    CBC with Auto Differential    Urine Drug Screen    Amb Referral to Nutrition Services    EKG 12 Lead          No orders of the defined types were placed in this encounter. Follow Up:  Return in about 2 weeks (around 8/30/2022).       NORA Carey - CNP

## 2022-08-23 ENCOUNTER — HOSPITAL ENCOUNTER (OUTPATIENT)
Age: 67
Discharge: HOME | End: 2022-08-23
Payer: MEDICARE

## 2022-08-23 DIAGNOSIS — Z79.899 MEDICATION MANAGEMENT: ICD-10-CM

## 2022-08-23 LAB
ALBUMIN SERPL-MCNC: 4.2 GM/DL (ref 3.4–5)
ALP BLD-CCNC: 77 IU/L (ref 40–129)
ALT SERPL-CCNC: 33 U/L (ref 10–40)
AMPHETAMINES: NEGATIVE
ANION GAP SERPL CALCULATED.3IONS-SCNC: 8 MMOL/L (ref 4–16)
AST SERPL-CCNC: 33 IU/L (ref 15–37)
BARBITURATE SCREEN URINE: NEGATIVE
BASOPHILS ABSOLUTE: 0 K/CU MM
BASOPHILS RELATIVE PERCENT: 0.9 % (ref 0–1)
BENZODIAZEPINE SCREEN, URINE: NEGATIVE
BILIRUB SERPL-MCNC: 0.3 MG/DL (ref 0–1)
BUN BLDV-MCNC: 13 MG/DL (ref 6–23)
CALCIUM SERPL-MCNC: 8.7 MG/DL (ref 8.3–10.6)
CANNABINOID SCREEN URINE: NEGATIVE
CHLORIDE BLD-SCNC: 104 MMOL/L (ref 99–110)
CO2: 28 MMOL/L (ref 21–32)
COCAINE METABOLITE: NEGATIVE
CREAT SERPL-MCNC: 0.6 MG/DL (ref 0.6–1.1)
DIFFERENTIAL TYPE: ABNORMAL
EKG ATRIAL RATE: 63 BPM
EKG DIAGNOSIS: NORMAL
EKG P AXIS: 13 DEGREES
EKG P-R INTERVAL: 110 MS
EKG Q-T INTERVAL: 418 MS
EKG QRS DURATION: 72 MS
EKG QTC CALCULATION (BAZETT): 427 MS
EKG R AXIS: 46 DEGREES
EKG T AXIS: 51 DEGREES
EKG VENTRICULAR RATE: 63 BPM
EOSINOPHILS ABSOLUTE: 0.1 K/CU MM
EOSINOPHILS RELATIVE PERCENT: 2.4 % (ref 0–3)
GFR AFRICAN AMERICAN: >60 ML/MIN/1.73M2
GFR NON-AFRICAN AMERICAN: >60 ML/MIN/1.73M2
GLUCOSE BLD-MCNC: 142 MG/DL (ref 70–99)
HCT VFR BLD CALC: 40.2 % (ref 37–47)
HEMOGLOBIN: 12.4 GM/DL (ref 12.5–16)
IMMATURE NEUTROPHIL %: 0.4 % (ref 0–0.43)
LYMPHOCYTES ABSOLUTE: 1.4 K/CU MM
LYMPHOCYTES RELATIVE PERCENT: 29.7 % (ref 24–44)
MCH RBC QN AUTO: 27.6 PG (ref 27–31)
MCHC RBC AUTO-ENTMCNC: 30.8 % (ref 32–36)
MCV RBC AUTO: 89.3 FL (ref 78–100)
MONOCYTES ABSOLUTE: 0.4 K/CU MM
MONOCYTES RELATIVE PERCENT: 8 % (ref 0–4)
NUCLEATED RBC %: 0 %
OPIATES, URINE: NEGATIVE
OXYCODONE: NEGATIVE
PDW BLD-RTO: 13.2 % (ref 11.7–14.9)
PHENCYCLIDINE, URINE: NEGATIVE
PLATELET # BLD: 269 K/CU MM (ref 140–440)
PMV BLD AUTO: 10.9 FL (ref 7.5–11.1)
POTASSIUM SERPL-SCNC: 4.9 MMOL/L (ref 3.5–5.1)
RBC # BLD: 4.5 M/CU MM (ref 4.2–5.4)
SEGMENTED NEUTROPHILS ABSOLUTE COUNT: 2.7 K/CU MM
SEGMENTED NEUTROPHILS RELATIVE PERCENT: 58.6 % (ref 36–66)
SODIUM BLD-SCNC: 140 MMOL/L (ref 135–145)
TOTAL IMMATURE NEUTOROPHIL: 0.02 K/CU MM
TOTAL NUCLEATED RBC: 0 K/CU MM
TOTAL PROTEIN: 6.3 GM/DL (ref 6.4–8.2)
WBC # BLD: 4.6 K/CU MM (ref 4–10.5)

## 2022-08-23 PROCEDURE — 80053 COMPREHEN METABOLIC PANEL: CPT

## 2022-08-23 PROCEDURE — 36415 COLL VENOUS BLD VENIPUNCTURE: CPT

## 2022-08-23 PROCEDURE — 85025 COMPLETE CBC W/AUTO DIFF WBC: CPT

## 2022-08-23 PROCEDURE — 93005 ELECTROCARDIOGRAM TRACING: CPT

## 2022-08-23 PROCEDURE — 80307 DRUG TEST PRSMV CHEM ANLYZR: CPT

## 2022-08-24 ENCOUNTER — OFFICE VISIT (OUTPATIENT)
Dept: BARIATRICS/WEIGHT MGMT | Age: 67
End: 2022-08-24

## 2022-08-24 VITALS — WEIGHT: 212.9 LBS | BODY MASS INDEX: 36.35 KG/M2 | HEIGHT: 64 IN

## 2022-08-24 DIAGNOSIS — E66.9 OBESITY (BMI 30-39.9): Primary | ICD-10-CM

## 2022-08-24 LAB
EKG ATRIAL RATE: 63 BPM
EKG DIAGNOSIS: NORMAL
EKG P AXIS: 31 DEGREES
EKG P-R INTERVAL: 114 MS
EKG Q-T INTERVAL: 428 MS
EKG QRS DURATION: 70 MS
EKG QTC CALCULATION (BAZETT): 437 MS
EKG R AXIS: 46 DEGREES
EKG T AXIS: 51 DEGREES
EKG VENTRICULAR RATE: 63 BPM

## 2022-08-24 PROCEDURE — 93010 ELECTROCARDIOGRAM REPORT: CPT | Performed by: INTERNAL MEDICINE

## 2022-08-24 PROCEDURE — 99999 PR OFFICE/OUTPT VISIT,PROCEDURE ONLY: CPT

## 2022-08-24 NOTE — PROGRESS NOTES
[facility] - Non-Surgical Weight Loss Program    REASON FOR VISIT: Medication Program    Chief Complaint:    Chief Complaint   Patient presents with    Weight Management       SUBJECTIVE:  Pt here for initial nutrition consult in medication program. Her challenges include eating out a lot and a dislike of counting calories. The patient is a 79 y.o. female being seen for obesity, enrolled in Non-Surgical Weight Loss Program; [facility], Height: 5' 4\" (162.6 cm), Weight: 212 lb 14.4 oz (96.6 kg), Current Body mass index is 36.54 kg/m². patient's PCP is Keena Palmer PA-C    Comorbid Conditions:  Significant diseases affecting this patient are   Past Medical History:   Diagnosis Date    Asthma     Basal cell carcinoma 04/15/2021    History of echocardiogram 01/27/2021    EF 55-60%, E/A reversal indterminate diastolic function, no significant valvular abnormalities, no pericardial effusion    History of exercise stress test 01/27/2021    Treadmill, normal    Muscle spasm 1/18/2021    Shortness of breath 1/18/2021   . Review of Systems - Review of Systems  Otherwise per HPI. Allergies:   Allergies   Allergen Reactions    Aleve [Naproxen] Hives       Past Surgical History:  Past Surgical History:   Procedure Laterality Date    CHOLECYSTECTOMY      HYSTERECTOMY (CERVIX STATUS UNKNOWN)      OVARY REMOVAL      age 39       Family History:  Family History   Problem Relation Age of Onset    Heart Surgery Mother     Hypertension Mother     Heart Surgery Father        Social History:  Social History     Socioeconomic History    Marital status:      Spouse name: Not on file    Number of children: Not on file    Years of education: Not on file    Highest education level: Not on file   Occupational History    Not on file   Tobacco Use    Smoking status: Never    Smokeless tobacco: Never   Vaping Use    Vaping Use: Not on file   Substance and Sexual Activity    Alcohol use: Not Currently

## 2022-08-29 ENCOUNTER — OFFICE VISIT (OUTPATIENT)
Dept: BARIATRICS/WEIGHT MGMT | Age: 67
End: 2022-08-29
Payer: MEDICARE

## 2022-08-29 VITALS
HEART RATE: 80 BPM | OXYGEN SATURATION: 100 % | DIASTOLIC BLOOD PRESSURE: 82 MMHG | HEIGHT: 64 IN | BODY MASS INDEX: 36.04 KG/M2 | SYSTOLIC BLOOD PRESSURE: 126 MMHG | WEIGHT: 211.1 LBS

## 2022-08-29 DIAGNOSIS — E66.9 OBESITY (BMI 30-39.9): Primary | ICD-10-CM

## 2022-08-29 DIAGNOSIS — Z79.899 MEDICATION MANAGEMENT: ICD-10-CM

## 2022-08-29 PROCEDURE — 1123F ACP DISCUSS/DSCN MKR DOCD: CPT | Performed by: NURSE PRACTITIONER

## 2022-08-29 PROCEDURE — 99213 OFFICE O/P EST LOW 20 MIN: CPT | Performed by: NURSE PRACTITIONER

## 2022-08-29 RX ORDER — PHENTERMINE HYDROCHLORIDE 37.5 MG/1
37.5 TABLET ORAL
Qty: 30 TABLET | Refills: 0 | Status: SHIPPED | OUTPATIENT
Start: 2022-08-29 | End: 2022-09-28

## 2022-08-29 ASSESSMENT — PATIENT HEALTH QUESTIONNAIRE - PHQ9
SUM OF ALL RESPONSES TO PHQ9 QUESTIONS 1 & 2: 0
SUM OF ALL RESPONSES TO PHQ QUESTIONS 1-9: 0
SUM OF ALL RESPONSES TO PHQ QUESTIONS 1-9: 0
1. LITTLE INTEREST OR PLEASURE IN DOING THINGS: 0
SUM OF ALL RESPONSES TO PHQ QUESTIONS 1-9: 0
2. FEELING DOWN, DEPRESSED OR HOPELESS: 0
SUM OF ALL RESPONSES TO PHQ QUESTIONS 1-9: 0

## 2022-08-29 ASSESSMENT — ENCOUNTER SYMPTOMS
GASTROINTESTINAL NEGATIVE: 1
ALLERGIC/IMMUNOLOGIC NEGATIVE: 1
RESPIRATORY NEGATIVE: 1
EYES NEGATIVE: 1

## 2022-08-29 NOTE — PROGRESS NOTES
Chief Complaint   Patient presents with    Weight Management     Testing results plan medication          SUBJECTIVE:    HPI: Patient is here with complaints of weight gain and inability to lose weight per self. Inquiring about weight loss medications to assist with their weight loss goal.    Obesity with a BMI of Body mass index is 36.24 kg/m². Em Colby Patient has failed to lose 5% of body weight for several years. Any history of glaucoma? Denies    Any history of drug abuse? Denies    Any history of CAD, uncontrolled HTN, arrhythmias, stroke, or CHF? ? Denies    Any history of hyperthyroidism? Denies    Any current or recent use of MAOIs? Denies    Current dietary measures: not currently    Current exercise measures: walking    I have reviewed the patient's(pertinent information to this visit) medical history, family history(scanned in  the [address] [place] [address] under \"patient questioner\"), social history and review of systems with the patient today in the office.           Past Surgical History:   Procedure Laterality Date    CHOLECYSTECTOMY      HYSTERECTOMY (CERVIX STATUS UNKNOWN)      OVARY REMOVAL      age 39       Past Medical History:   Diagnosis Date    Asthma     Basal cell carcinoma 04/15/2021    History of echocardiogram 01/27/2021    EF 55-60%, E/A reversal indterminate diastolic function, no significant valvular abnormalities, no pericardial effusion    History of exercise stress test 01/27/2021    Treadmill, normal    Muscle spasm 1/18/2021    Shortness of breath 1/18/2021       Family History   Problem Relation Age of Onset    Heart Surgery Mother     Hypertension Mother     Heart Surgery Father        Social History     Socioeconomic History    Marital status:      Spouse name: Not on file    Number of children: Not on file    Years of education: Not on file    Highest education level: Not on file   Occupational History    Not on file   Tobacco Use    Smoking status: Never    Smokeless tobacco: Never Vaping Use    Vaping Use: Not on file   Substance and Sexual Activity    Alcohol use: Not Currently     Alcohol/week: 0.0 standard drinks    Drug use: Never    Sexual activity: Yes     Partners: Male   Other Topics Concern    Not on file   Social History Narrative    Not on file     Social Determinants of Health     Financial Resource Strain: Not on file   Food Insecurity: Not on file   Transportation Needs: Not on file   Physical Activity: Inactive    Days of Exercise per Week: 0 days    Minutes of Exercise per Session: 0 min   Stress: Not on file   Social Connections: Not on file   Intimate Partner Violence: Not on file   Housing Stability: Not on file       Current Outpatient Medications   Medication Sig Dispense Refill    phentermine (ADIPEX-P) 37.5 MG tablet Take 1 tablet by mouth every morning (before breakfast) for 30 days. BMI 44. 30 tablet 0    Cholecalciferol (VITAMIN D3) 1.25 MG (79313 UT) TABS Take by mouth      UNABLE TO FIND A reds 2 (pill). Once daily. Multiple Vitamins-Minerals (VITEYES AREDS FORMULA PO) Take by mouth (Patient not taking: Reported on 8/16/2022)      albuterol sulfate  (90 Base) MCG/ACT inhaler Inhale 2 puffs into the lungs every 6 hours as needed for Wheezing 1 g 5    VITAMIN D PO Take by mouth daily  (Patient not taking: No sig reported)      Red Yeast Rice Extract (RED YEAST RICE PO) Take by mouth        No current facility-administered medications for this visit. Allergies   Allergen Reactions    Aleve [Naproxen] Hives       Review of Systems:         Review of Systems   Constitutional: Negative. HENT:          Having skin cancer removed form nasal/ lip area   Eyes: Negative. Respiratory: Negative. Cardiovascular: Negative. Gastrointestinal: Negative. Endocrine: Negative. Genitourinary: Negative. Musculoskeletal: Negative. Skin: Negative. Allergic/Immunologic: Negative. Neurological: Negative. Hematological: Negative. Psychiatric/Behavioral: Negative. OBJECTIVE:  Physical Exam:    /82   Pulse 80   Ht 5' 4\" (1.626 m)   Wt 211 lb 1.6 oz (95.8 kg)   SpO2 100%   BMI 36.24 kg/m²      Physical Exam  Constitutional:       Appearance: Normal appearance. She is obese. HENT:      Head: Normocephalic. Nose: Nose normal.      Mouth/Throat:      Pharynx: Oropharynx is clear. Eyes:      Conjunctiva/sclera: Conjunctivae normal.      Pupils: Pupils are equal, round, and reactive to light. Cardiovascular:      Rate and Rhythm: Normal rate. Pulses: Normal pulses. Pulmonary:      Effort: Pulmonary effort is normal.   Abdominal:      Palpations: Abdomen is soft. Musculoskeletal:         General: Normal range of motion. Cervical back: Normal range of motion. Skin:     General: Skin is warm and dry. Capillary Refill: Capillary refill takes less than 2 seconds. Neurological:      General: No focal deficit present. Mental Status: She is alert and oriented to person, place, and time. Psychiatric:         Mood and Affect: Mood normal.         Behavior: Behavior normal.         ASSESSMENT:  1. Obesity (BMI 30-39.9)  - Start tracking calories; about 1200-1400daily  - 64 oz water daily  - Increase activity as able      2. Medication management  - Meets criteria to start Adipex      PLAN:    - Recent Labs, UDS, and EKG reviewed and WNL    - Patient will start on Adipex in am and take as directed    - Call for adverse side effects or concerns    - BMI is over 30, or over 27 with weight-related risk factors. - Pt aware Adipex can only be used short term (3 months). - Pt aware no breaks in treatment allowed or must be off for 6 months. - Pt aware that weight must be lost at each visit or medication will be discontinued. - Pt is aware that in order to be successful, must combine Adipex with appropriate diet and exercise plan.      - Pt provided with medication handout that covers use; side effects (common side effects include insomnia, dry mouth, constipation, nervousness, increased heart rate, hypertension);    precautions; and interactions. - Pt aware must meet monthly in person while on this medication.     - Check [place] Automated Rx Reporting System. Any concerns: NONE Reported     - Current birth control measures include: hysterectomy    - If elderly or renal impairment, will use lower dose (15 mg daily) and avoid all together if GFR is less than 15. N/A     - RTC in one month    No orders of the defined types were placed in this encounter. Orders Placed This Encounter   Medications    phentermine (ADIPEX-P) 37.5 MG tablet     Sig: Take 1 tablet by mouth every morning (before breakfast) for 30 days. BMI 44. Dispense:  30 tablet     Refill:  0          Follow Up:  Return in about 1 month (around 9/29/2022).       NORA Dejesus - CNP

## 2022-09-29 ENCOUNTER — OFFICE VISIT (OUTPATIENT)
Dept: BARIATRICS/WEIGHT MGMT | Age: 67
End: 2022-09-29
Payer: MEDICARE

## 2022-09-29 VITALS
OXYGEN SATURATION: 98 % | BODY MASS INDEX: 34.18 KG/M2 | HEIGHT: 64 IN | DIASTOLIC BLOOD PRESSURE: 76 MMHG | SYSTOLIC BLOOD PRESSURE: 128 MMHG | HEART RATE: 80 BPM | WEIGHT: 200.2 LBS

## 2022-09-29 DIAGNOSIS — Z79.899 MEDICATION MANAGEMENT: ICD-10-CM

## 2022-09-29 DIAGNOSIS — E66.9 OBESITY (BMI 30-39.9): Primary | ICD-10-CM

## 2022-09-29 PROCEDURE — 99213 OFFICE O/P EST LOW 20 MIN: CPT | Performed by: NURSE PRACTITIONER

## 2022-09-29 PROCEDURE — 1123F ACP DISCUSS/DSCN MKR DOCD: CPT | Performed by: NURSE PRACTITIONER

## 2022-09-29 RX ORDER — PHENTERMINE HYDROCHLORIDE 37.5 MG/1
37.5 TABLET ORAL
Qty: 30 TABLET | Refills: 0 | Status: SHIPPED | OUTPATIENT
Start: 2022-09-29 | End: 2022-10-29

## 2022-09-29 ASSESSMENT — ENCOUNTER SYMPTOMS
EYES NEGATIVE: 1
ALLERGIC/IMMUNOLOGIC NEGATIVE: 1
RESPIRATORY NEGATIVE: 1
GASTROINTESTINAL NEGATIVE: 1

## 2022-09-29 NOTE — PROGRESS NOTES
Chief Complaint   Patient presents with    Weight Management     F/U Medication WM Visit - Adipex #2  Has Bar Cov         SUBJECTIVE:    HPI: Patient is here with complaints of: Monthly Adipex visit. Obesity with a BMI of Body mass index is 34.36 kg/m². .    Current weight: 200 pounds    Weight change since last visit: -10.9 pounds (if pt failed to lose weight, cannot remain on medication). Month 2 of 3 of being on Adipex. Any new absolute contraindications (glaucoma, drug abuse, CAD, uncontrolled HTN, arrhythmias, stroke, CHF, hyperthyroidism, MAOI use, pregnant or breastfeeding) to being on medication: DENIES     Pt complains of the following side effects: DENIES    Current dietary measures: tries portion control    Current exercise measures: walking    I have reviewed the patient's(pertinent information to this visit) medical history, family history(scanned in  the [address] [place] [address] under \"patient questioner\"), social history and review of systems with the patient today in the office.           Past Surgical History:   Procedure Laterality Date    CHOLECYSTECTOMY      HYSTERECTOMY (CERVIX STATUS UNKNOWN)      OVARY REMOVAL      age 39       Past Medical History:   Diagnosis Date    Asthma     Basal cell carcinoma 04/15/2021    History of echocardiogram 01/27/2021    EF 55-60%, E/A reversal indterminate diastolic function, no significant valvular abnormalities, no pericardial effusion    History of exercise stress test 01/27/2021    Treadmill, normal    Muscle spasm 1/18/2021    Shortness of breath 1/18/2021       Family History   Problem Relation Age of Onset    Heart Surgery Mother     Hypertension Mother     Heart Surgery Father        Social History     Socioeconomic History    Marital status:      Spouse name: Not on file    Number of children: Not on file    Years of education: Not on file    Highest education level: Not on file   Occupational History    Not on file   Tobacco Use    Smoking status: Never    Smokeless tobacco: Never   Vaping Use    Vaping Use: Not on file   Substance and Sexual Activity    Alcohol use: Not Currently     Alcohol/week: 0.0 standard drinks    Drug use: Never    Sexual activity: Yes     Partners: Male   Other Topics Concern    Not on file   Social History Narrative    Not on file     Social Determinants of Health     Financial Resource Strain: Not on file   Food Insecurity: Not on file   Transportation Needs: Not on file   Physical Activity: Inactive    Days of Exercise per Week: 0 days    Minutes of Exercise per Session: 0 min   Stress: Not on file   Social Connections: Not on file   Intimate Partner Violence: Not on file   Housing Stability: Not on file       Current Outpatient Medications   Medication Sig Dispense Refill    Multiple Vitamins-Minerals (ICAPS AREDS FORMULA PO) Take by mouth      phentermine (ADIPEX-P) 37.5 MG tablet Take 1 tablet by mouth every morning (before breakfast) for 30 days. BMI 44. 30 tablet 0    Cholecalciferol (VITAMIN D3) 1.25 MG (68797 UT) TABS Take by mouth      albuterol sulfate  (90 Base) MCG/ACT inhaler Inhale 2 puffs into the lungs every 6 hours as needed for Wheezing 1 g 5    Red Yeast Rice Extract (RED YEAST RICE PO) Take by mouth       Multiple Vitamins-Minerals (VITEYES AREDS FORMULA PO) Take by mouth (Patient not taking: No sig reported)      VITAMIN D PO Take by mouth daily  (Patient not taking: No sig reported)       No current facility-administered medications for this visit. Allergies   Allergen Reactions    Aleve [Naproxen] Hives       Review of Systems:         Review of Systems   Constitutional: Negative. HENT: Negative. Eyes: Negative. Respiratory: Negative. Cardiovascular: Negative. Gastrointestinal: Negative. Endocrine: Negative. Genitourinary: Negative. Musculoskeletal: Negative. Skin: Negative. Allergic/Immunologic: Negative. Neurological: Negative. Hematological: Negative. Psychiatric/Behavioral: Negative. OBJECTIVE:  Physical Exam:    /76 (Site: Right Upper Arm, Position: Sitting, Cuff Size: Medium Adult)   Pulse 80   Ht 5' 4\" (1.626 m)   Wt 200 lb 3.2 oz (90.8 kg)   SpO2 98%   BMI 34.36 kg/m²      Physical Exam  Constitutional:       Appearance: Normal appearance. She is obese. HENT:      Head: Normocephalic. Nose: Nose normal.      Mouth/Throat:      Pharynx: Oropharynx is clear. Eyes:      Conjunctiva/sclera: Conjunctivae normal.      Pupils: Pupils are equal, round, and reactive to light. Cardiovascular:      Rate and Rhythm: Normal rate. Pulses: Normal pulses. Pulmonary:      Effort: Pulmonary effort is normal.   Abdominal:      Palpations: Abdomen is soft. Musculoskeletal:         General: Normal range of motion. Cervical back: Normal range of motion. Skin:     General: Skin is warm and dry. Capillary Refill: Capillary refill takes less than 2 seconds. Neurological:      General: No focal deficit present. Mental Status: She is alert and oriented to person, place, and time. Psychiatric:         Mood and Affect: Mood normal.         Behavior: Behavior normal.         ASSESSMENT:  1. Obesity (BMI 30-39.9)  - Continue tracking calories; about  0471-0950 daily  - 64 oz water daily  - Increase activity as able    2. Medication management  - Doing well; down 10.9 pounds since last visit  - Denies any side effects  - RX refill sent      PLAN:    -Continue Adipex. RX REFILLED.    -BMI is over 30, or over 27 with weight-related risk factors.     -Pt demonstrated weight loss since last visit.    -Pt aware Adipex can only be used short term (3 months). -Pt aware no breaks in treatment allowed or must be off for 6 months.    -Pt aware that weight must be lost at each visit or medication will be discontinued.     -Pt is aware that in order to be successful, must combine Adipex with appropriate diet and exercise plan.      -Pt aware must continue to meet monthly in person while on this medication. F/u in one month. -Check [place] Automated Rx Reporting System. Any concerns: NONE Reported     - Current birth control measures include: hysterectomy    -If elderly or renal impairment, will use lower dose (15 mg daily) and avoid all together if GFR is less than 15. N/A      No orders of the defined types were placed in this encounter. Orders Placed This Encounter   Medications    phentermine (ADIPEX-P) 37.5 MG tablet     Sig: Take 1 tablet by mouth every morning (before breakfast) for 30 days. BMI 44. Dispense:  30 tablet     Refill:  0          Follow Up:  Return in about 1 month (around 10/29/2022).       Bernardo Carlos, APRN - CNP

## 2022-10-26 ENCOUNTER — OFFICE VISIT (OUTPATIENT)
Dept: BARIATRICS/WEIGHT MGMT | Age: 67
End: 2022-10-26
Payer: MEDICARE

## 2022-10-26 VITALS — HEIGHT: 64 IN | WEIGHT: 195.7 LBS | OXYGEN SATURATION: 100 % | BODY MASS INDEX: 33.41 KG/M2

## 2022-10-26 DIAGNOSIS — E66.9 OBESITY (BMI 30-39.9): Primary | ICD-10-CM

## 2022-10-26 DIAGNOSIS — Z79.899 MEDICATION MANAGEMENT: ICD-10-CM

## 2022-10-26 PROCEDURE — 99213 OFFICE O/P EST LOW 20 MIN: CPT | Performed by: NURSE PRACTITIONER

## 2022-10-26 PROCEDURE — 1123F ACP DISCUSS/DSCN MKR DOCD: CPT | Performed by: NURSE PRACTITIONER

## 2022-10-26 RX ORDER — PHENTERMINE HYDROCHLORIDE 37.5 MG/1
37.5 TABLET ORAL
Qty: 30 TABLET | Refills: 0 | Status: SHIPPED | OUTPATIENT
Start: 2022-10-26 | End: 2022-11-25

## 2022-10-26 NOTE — PROGRESS NOTES
Chief Complaint   Patient presents with    Weight Management     Med wm  adipex  # 3          SUBJECTIVE:    HPI: Patient is here with complaints of: Monthly Adipex visit. Obesity with a BMI of Body mass index is 33.59 kg/m². .    Current weight: 195 pounds    Weight change since last visit: -4.5 pounds (if pt failed to lose weight, cannot remain on medication). Month 3 of 3 of being on Adipex. Any new absolute contraindications (glaucoma, drug abuse, CAD, uncontrolled HTN, arrhythmias, stroke, CHF, hyperthyroidism, MAOI use, pregnant or breastfeeding) to being on medication: DENIES     Pt complains of the following side effects: DENIES    Current dietary measures: not tracking calories; snacking in evening    Current exercise measures: stays active    I have reviewed the patient's(pertinent information to this visit) medical history, family history(scanned in  the [address] [place] [address] under \"patient questioner\"), social history and review of systems with the patient today in the office.           Past Surgical History:   Procedure Laterality Date    CHOLECYSTECTOMY      HYSTERECTOMY (CERVIX STATUS UNKNOWN)      OVARY REMOVAL      age 39       Past Medical History:   Diagnosis Date    Asthma     Basal cell carcinoma 04/15/2021    History of echocardiogram 01/27/2021    EF 55-60%, E/A reversal indterminate diastolic function, no significant valvular abnormalities, no pericardial effusion    History of exercise stress test 01/27/2021    Treadmill, normal    Muscle spasm 1/18/2021    Shortness of breath 1/18/2021       Family History   Problem Relation Age of Onset    Heart Surgery Mother     Hypertension Mother     Heart Surgery Father        Social History     Socioeconomic History    Marital status:      Spouse name: Not on file    Number of children: Not on file    Years of education: Not on file    Highest education level: Not on file   Occupational History    Not on file   Tobacco Use    Smoking status: Never Smokeless tobacco: Never   Vaping Use    Vaping Use: Not on file   Substance and Sexual Activity    Alcohol use: Not Currently     Alcohol/week: 0.0 standard drinks    Drug use: Never    Sexual activity: Yes     Partners: Male   Other Topics Concern    Not on file   Social History Narrative    Not on file     Social Determinants of Health     Financial Resource Strain: Not on file   Food Insecurity: Not on file   Transportation Needs: Not on file   Physical Activity: Inactive    Days of Exercise per Week: 0 days    Minutes of Exercise per Session: 0 min   Stress: Not on file   Social Connections: Not on file   Intimate Partner Violence: Not on file   Housing Stability: Not on file       Current Outpatient Medications   Medication Sig Dispense Refill    phentermine (ADIPEX-P) 37.5 MG tablet Take 1 tablet by mouth every morning (before breakfast) for 30 days. BMI 44. 30 tablet 0    Multiple Vitamins-Minerals (ICAPS AREDS FORMULA PO) Take by mouth      phentermine (ADIPEX-P) 37.5 MG tablet Take 1 tablet by mouth every morning (before breakfast) for 30 days. BMI 44. 30 tablet 0    Cholecalciferol (VITAMIN D3) 1.25 MG (93405 UT) TABS Take by mouth      Multiple Vitamins-Minerals (VITEYES AREDS FORMULA PO) Take by mouth      albuterol sulfate  (90 Base) MCG/ACT inhaler Inhale 2 puffs into the lungs every 6 hours as needed for Wheezing 1 g 5    VITAMIN D PO Take by mouth daily       Red Yeast Rice Extract (RED YEAST RICE PO) Take by mouth        No current facility-administered medications for this visit. Allergies   Allergen Reactions    Aleve [Naproxen] Hives       Review of Systems:         Review of Systems   Constitutional: Negative. HENT: Negative. Eyes: Negative. Respiratory: Negative. Cardiovascular: Negative. Gastrointestinal: Negative. Endocrine: Negative. Genitourinary: Negative. Musculoskeletal: Negative. Skin: Negative. Allergic/Immunologic: Negative. Neurological: Negative. Hematological: Negative. Psychiatric/Behavioral: Negative. OBJECTIVE:  Physical Exam:    Ht 5' 4\" (1.626 m)   Wt 195 lb 11.2 oz (88.8 kg)   SpO2 100%   BMI 33.59 kg/m²      Physical Exam  Constitutional:       Appearance: Normal appearance. She is obese. HENT:      Head: Normocephalic. Nose: Nose normal.      Mouth/Throat:      Pharynx: Oropharynx is clear. Eyes:      Conjunctiva/sclera: Conjunctivae normal.      Pupils: Pupils are equal, round, and reactive to light. Cardiovascular:      Rate and Rhythm: Normal rate. Pulses: Normal pulses. Pulmonary:      Effort: Pulmonary effort is normal.   Musculoskeletal:         General: Normal range of motion. Cervical back: Normal range of motion. Skin:     General: Skin is warm and dry. Capillary Refill: Capillary refill takes less than 2 seconds. Neurological:      General: No focal deficit present. Mental Status: She is alert and oriented to person, place, and time. Psychiatric:         Mood and Affect: Mood normal.         Behavior: Behavior normal.         ASSESSMENT:  1. Obesity (BMI 30-39.9)  - Continue tracking calories; about  6346-8967 daily  - 64 oz water daily  - Increase activity as able    2. Medication management  - Doing well; down 4.5 additional pounds since last visit  - Denies any side effects  - RX refill sent for last month Adipex  - Wean off last 4 days of therapy- take 1/2 dose every other day  - Not interested in transition medication  - Possible RD program  - RTC one month      PLAN:    -Continue Adipex. RX REFILLED.    -BMI is over 30, or over 27 with weight-related risk factors.     -Pt demonstrated weight loss since last visit.    -Pt aware Adipex can only be used short term (3 months).     -Pt aware no breaks in treatment allowed or must be off for 6 months.    -Pt aware that weight must be lost at each visit or medication will be discontinued.     -Pt is aware that in order to be successful, must combine Adipex with appropriate diet and exercise plan. -Pt aware must continue to meet monthly in person while on this medication. F/u in one month. -Check [place] Automated Rx Reporting System. Any concerns: NONE Reported     - Current birth control measures include: hysterectomy    -If elderly or renal impairment, will use lower dose (15 mg daily) and avoid all together if GFR is less than 15. N/A      No orders of the defined types were placed in this encounter. Orders Placed This Encounter   Medications    phentermine (ADIPEX-P) 37.5 MG tablet     Sig: Take 1 tablet by mouth every morning (before breakfast) for 30 days. BMI 44. Dispense:  30 tablet     Refill:  0          Follow Up:  Return in about 1 month (around 11/26/2022).       NORA Ho - CNP

## 2022-11-14 ENCOUNTER — OFFICE VISIT (OUTPATIENT)
Dept: FAMILY MEDICINE CLINIC | Age: 67
End: 2022-11-14
Payer: MEDICARE

## 2022-11-14 VITALS
WEIGHT: 192.2 LBS | SYSTOLIC BLOOD PRESSURE: 122 MMHG | TEMPERATURE: 97.9 F | BODY MASS INDEX: 32.99 KG/M2 | DIASTOLIC BLOOD PRESSURE: 74 MMHG | RESPIRATION RATE: 16 BRPM | HEART RATE: 76 BPM | OXYGEN SATURATION: 98 %

## 2022-11-14 DIAGNOSIS — R73.03 PREDIABETES: Primary | ICD-10-CM

## 2022-11-14 LAB — HBA1C MFR BLD: 5.5 %

## 2022-11-14 PROCEDURE — 99213 OFFICE O/P EST LOW 20 MIN: CPT | Performed by: PHYSICIAN ASSISTANT

## 2022-11-14 PROCEDURE — 83036 HEMOGLOBIN GLYCOSYLATED A1C: CPT | Performed by: PHYSICIAN ASSISTANT

## 2022-11-14 PROCEDURE — 1123F ACP DISCUSS/DSCN MKR DOCD: CPT | Performed by: PHYSICIAN ASSISTANT

## 2022-11-14 ASSESSMENT — ENCOUNTER SYMPTOMS
COUGH: 0
SHORTNESS OF BREATH: 0

## 2022-11-14 ASSESSMENT — PATIENT HEALTH QUESTIONNAIRE - PHQ9
SUM OF ALL RESPONSES TO PHQ QUESTIONS 1-9: 0
SUM OF ALL RESPONSES TO PHQ9 QUESTIONS 1 & 2: 0
SUM OF ALL RESPONSES TO PHQ QUESTIONS 1-9: 0
2. FEELING DOWN, DEPRESSED OR HOPELESS: 0
1. LITTLE INTEREST OR PLEASURE IN DOING THINGS: 0
SUM OF ALL RESPONSES TO PHQ QUESTIONS 1-9: 0
SUM OF ALL RESPONSES TO PHQ QUESTIONS 1-9: 0

## 2022-11-14 NOTE — PROGRESS NOTES
Chiki Magallanes  1955  79 y.o.  female    SUBJECTIVE:    Chief Complaint   Patient presents with    Flu Vaccine     Declined flu vaccine at this time     Follow-up     No questions or concerns at this time       HPI  Pt here for recheck A1C. Has been prediabetic and last A1C in March was 6. 3. pt has been working on wt loss (using adipex), diet changes,exercise. She has lost a total of 20# since August (today is 11/14/2022). Denies polyuria/polydipsia/polyphagia at this time. PHQ Scores 11/14/2022 8/29/2022 8/16/2022 7/14/2022 3/1/2022 10/21/2021 5/20/2021   PHQ2 Score 0 0 0 0 0 0 0   PHQ9 Score 0 0 0 0 0 0 0     Interpretation of Total Score Depression Severity: 1-4 = Minimal depression, 5-9 = Mild depression, 10-14 = Moderate depression, 15-19 = Moderately severe depression, 20-27 = Severe depression     Current Outpatient Medications on File Prior to Visit   Medication Sig Dispense Refill    phentermine (ADIPEX-P) 37.5 MG tablet Take 1 tablet by mouth every morning (before breakfast) for 30 days. BMI 44. 30 tablet 0    Cholecalciferol (VITAMIN D3) 1.25 MG (26588 UT) TABS Take by mouth      albuterol sulfate  (90 Base) MCG/ACT inhaler Inhale 2 puffs into the lungs every 6 hours as needed for Wheezing 1 g 5    VITAMIN D PO Take by mouth daily       Red Yeast Rice Extract (RED YEAST RICE PO) Take by mouth        No current facility-administered medications on file prior to visit.        Allergies   Allergen Reactions    Aleve [Naproxen] Hives       Past Medical History:   Diagnosis Date    Asthma     Basal cell carcinoma 04/15/2021    History of echocardiogram 01/27/2021    EF 55-60%, E/A reversal indterminate diastolic function, no significant valvular abnormalities, no pericardial effusion    History of exercise stress test 01/27/2021    Treadmill, normal    Muscle spasm 1/18/2021    Shortness of breath 1/18/2021       Past Surgical History:   Procedure Laterality Date    CHOLECYSTECTOMY HYSTERECTOMY (CERVIX STATUS UNKNOWN)      OVARY REMOVAL      age 39       Social History     Socioeconomic History    Marital status:      Spouse name: None    Number of children: None    Years of education: None    Highest education level: None   Tobacco Use    Smoking status: Never    Smokeless tobacco: Never   Substance and Sexual Activity    Alcohol use: Not Currently     Alcohol/week: 0.0 standard drinks    Drug use: Never    Sexual activity: Yes     Partners: Male     Social Determinants of Health     Physical Activity: Inactive    Days of Exercise per Week: 0 days    Minutes of Exercise per Session: 0 min       Review of Systems   Respiratory:  Negative for cough and shortness of breath. Cardiovascular:  Negative for palpitations. Endocrine: Negative for polydipsia, polyphagia and polyuria. Psychiatric/Behavioral: Negative. OBJECTIVE:    /74 (Site: Left Upper Arm, Position: Sitting, Cuff Size: Medium Adult)   Pulse 76   Temp 97.9 °F (36.6 °C) (Infrared)   Resp 16   Wt 192 lb 3.2 oz (87.2 kg)   SpO2 98%   BMI 32.99 kg/m²     Physical Exam  Vitals reviewed. Constitutional:       Appearance: Normal appearance. She is obese. HENT:      Head: Normocephalic. Cardiovascular:      Rate and Rhythm: Normal rate and regular rhythm. Pulmonary:      Effort: Pulmonary effort is normal.      Breath sounds: Normal breath sounds. Skin:     General: Skin is warm and dry. Neurological:      Mental Status: She is alert and oriented to person, place, and time. Psychiatric:         Mood and Affect: Mood normal.         Behavior: Behavior normal.         Thought Content: Thought content normal.       Vivek Room:    Problem List          Other    Prediabetes - Primary      A1C today is 5.5. continue lifetstyle changes  Recheck in 3-4 months, sooner prn                   Return in about 4 months (around 3/14/2023).

## 2022-11-28 ENCOUNTER — OFFICE VISIT (OUTPATIENT)
Dept: BARIATRICS/WEIGHT MGMT | Age: 67
End: 2022-11-28
Payer: MEDICARE

## 2022-11-28 VITALS
HEIGHT: 64 IN | WEIGHT: 189.8 LBS | BODY MASS INDEX: 32.4 KG/M2 | OXYGEN SATURATION: 97 % | SYSTOLIC BLOOD PRESSURE: 120 MMHG | DIASTOLIC BLOOD PRESSURE: 72 MMHG | HEART RATE: 78 BPM

## 2022-11-28 DIAGNOSIS — E66.9 OBESITY (BMI 30-39.9): Primary | ICD-10-CM

## 2022-11-28 DIAGNOSIS — Z79.899 MEDICATION MANAGEMENT: ICD-10-CM

## 2022-11-28 PROCEDURE — 1123F ACP DISCUSS/DSCN MKR DOCD: CPT | Performed by: NURSE PRACTITIONER

## 2022-11-28 PROCEDURE — 99213 OFFICE O/P EST LOW 20 MIN: CPT | Performed by: NURSE PRACTITIONER

## 2022-11-28 ASSESSMENT — PATIENT HEALTH QUESTIONNAIRE - PHQ9
SUM OF ALL RESPONSES TO PHQ QUESTIONS 1-9: 0
2. FEELING DOWN, DEPRESSED OR HOPELESS: 0
SUM OF ALL RESPONSES TO PHQ QUESTIONS 1-9: 0
SUM OF ALL RESPONSES TO PHQ9 QUESTIONS 1 & 2: 0
SUM OF ALL RESPONSES TO PHQ QUESTIONS 1-9: 0
SUM OF ALL RESPONSES TO PHQ QUESTIONS 1-9: 0
1. LITTLE INTEREST OR PLEASURE IN DOING THINGS: 0

## 2022-11-28 NOTE — PROGRESS NOTES
Chief Complaint   Patient presents with    Weight Management     F/U- Medication wm visit. Adipex completed. Has bar cov. SUBJECTIVE:    HPI: Patient is here with complaints of: Monthly Adipex visit. Obesity with a BMI of Body mass index is 32.58 kg/m². .    Current weight: 189 pounds    Weight change since last visit: -5.9 pounds (if pt failed to lose weight, cannot remain on medication). Adipex completed without side effects. Any new absolute contraindications (glaucoma, drug abuse, CAD, uncontrolled HTN, arrhythmias, stroke, CHF, hyperthyroidism, MAOI use, pregnant or breastfeeding) to being on medication: DENIES     Pt complains of the following side effects: DENIES    Current dietary measures: tracking calories    Current exercise measures: stays active    I have reviewed the patient's(pertinent information to this visit) medical history, family history(scanned in  the [address] [place] [address] under \"patient questioner\"), social history and review of systems with the patient today in the office.           Past Surgical History:   Procedure Laterality Date    CHOLECYSTECTOMY      HYSTERECTOMY (CERVIX STATUS UNKNOWN)      OVARY REMOVAL      age 39       Past Medical History:   Diagnosis Date    Asthma     Basal cell carcinoma 04/15/2021    History of echocardiogram 01/27/2021    EF 55-60%, E/A reversal indterminate diastolic function, no significant valvular abnormalities, no pericardial effusion    History of exercise stress test 01/27/2021    Treadmill, normal    Muscle spasm 1/18/2021    Shortness of breath 1/18/2021       Family History   Problem Relation Age of Onset    Heart Surgery Mother     Hypertension Mother     Heart Surgery Father        Social History     Socioeconomic History    Marital status:      Spouse name: Not on file    Number of children: Not on file    Years of education: Not on file    Highest education level: Not on file   Occupational History    Not on file   Tobacco Use Smoking status: Never    Smokeless tobacco: Never   Vaping Use    Vaping Use: Not on file   Substance and Sexual Activity    Alcohol use: Not Currently     Alcohol/week: 0.0 standard drinks    Drug use: Never    Sexual activity: Yes     Partners: Male   Other Topics Concern    Not on file   Social History Narrative    Not on file     Social Determinants of Health     Financial Resource Strain: Not on file   Food Insecurity: Not on file   Transportation Needs: Not on file   Physical Activity: Inactive    Days of Exercise per Week: 0 days    Minutes of Exercise per Session: 0 min   Stress: Not on file   Social Connections: Not on file   Intimate Partner Violence: Not on file   Housing Stability: Not on file       Current Outpatient Medications   Medication Sig Dispense Refill    UNABLE TO FIND AREDS supplement - for eyes. BID      Cholecalciferol (VITAMIN D3) 1.25 MG (77130 UT) TABS Take by mouth      albuterol sulfate  (90 Base) MCG/ACT inhaler Inhale 2 puffs into the lungs every 6 hours as needed for Wheezing 1 g 5    Red Yeast Rice Extract (RED YEAST RICE PO) Take by mouth       VITAMIN D PO Take by mouth daily  (Patient not taking: Reported on 11/28/2022)       No current facility-administered medications for this visit. Allergies   Allergen Reactions    Aleve [Naproxen] Hives       Review of Systems:         Review of Systems   All other systems reviewed and are negative. OBJECTIVE:  Physical Exam:    /72 (Site: Left Upper Arm, Position: Sitting, Cuff Size: Medium Adult)   Pulse 78   Ht 5' 4\" (1.626 m)   Wt 189 lb 12.8 oz (86.1 kg)   SpO2 97%   BMI 32.58 kg/m²      Physical Exam  Constitutional:       Appearance: Normal appearance. She is obese. HENT:      Head: Normocephalic. Nose: Nose normal.      Mouth/Throat:      Pharynx: Oropharynx is clear. Eyes:      Conjunctiva/sclera: Conjunctivae normal.      Pupils: Pupils are equal, round, and reactive to light. Cardiovascular:      Rate and Rhythm: Normal rate. Pulses: Normal pulses. Pulmonary:      Effort: Pulmonary effort is normal.   Abdominal:      Palpations: Abdomen is soft. Musculoskeletal:         General: Normal range of motion. Cervical back: Normal range of motion. Skin:     General: Skin is warm and dry. Capillary Refill: Capillary refill takes less than 2 seconds. Neurological:      General: No focal deficit present. Mental Status: She is alert and oriented to person, place, and time. Psychiatric:         Mood and Affect: Mood normal.         Behavior: Behavior normal.         ASSESSMENT:  1. Obesity (BMI 30-39.9)  - Continue tracking calories; about  1982-0982 daily  - Continue 64 oz water daily  - Increase activity as able    - Amb Referral to Nutrition Services    2. Medication management  - Adipex therapy completed without difficulty  - Doing well; down 5.9 this visit  - Total weight loss of 19.1 pounds with medication  - BMI now 32.58  - Not interested in transition medication  - RD referral for RD program  - RTC as needed      Orders Placed This Encounter   Procedures    Amb Referral to Nutrition Services          No orders of the defined types were placed in this encounter. Follow Up:  Return in about 1 month (around 12/28/2022).       NORA Pena - CNP

## 2023-03-16 ENCOUNTER — OFFICE VISIT (OUTPATIENT)
Dept: FAMILY MEDICINE CLINIC | Age: 68
End: 2023-03-16

## 2023-03-16 VITALS
TEMPERATURE: 97.4 F | HEART RATE: 68 BPM | OXYGEN SATURATION: 98 % | DIASTOLIC BLOOD PRESSURE: 76 MMHG | RESPIRATION RATE: 18 BRPM | BODY MASS INDEX: 31.65 KG/M2 | SYSTOLIC BLOOD PRESSURE: 124 MMHG | WEIGHT: 184.4 LBS

## 2023-03-16 DIAGNOSIS — H69.83 DYSFUNCTION OF BOTH EUSTACHIAN TUBES: ICD-10-CM

## 2023-03-16 DIAGNOSIS — J45.20 MILD INTERMITTENT ASTHMA WITHOUT COMPLICATION: Primary | ICD-10-CM

## 2023-03-16 PROBLEM — H69.93 DYSFUNCTION OF BOTH EUSTACHIAN TUBES: Status: ACTIVE | Noted: 2023-03-16

## 2023-03-16 PROCEDURE — 1123F ACP DISCUSS/DSCN MKR DOCD: CPT | Performed by: PHYSICIAN ASSISTANT

## 2023-03-16 RX ORDER — FLUTICASONE PROPIONATE 50 MCG
1 SPRAY, SUSPENSION (ML) NASAL DAILY
Qty: 1 G | Refills: 5 | Status: SHIPPED | OUTPATIENT
Start: 2023-03-16

## 2023-03-16 SDOH — ECONOMIC STABILITY: FOOD INSECURITY: WITHIN THE PAST 12 MONTHS, YOU WORRIED THAT YOUR FOOD WOULD RUN OUT BEFORE YOU GOT MONEY TO BUY MORE.: NEVER TRUE

## 2023-03-16 SDOH — ECONOMIC STABILITY: HOUSING INSECURITY
IN THE LAST 12 MONTHS, WAS THERE A TIME WHEN YOU DID NOT HAVE A STEADY PLACE TO SLEEP OR SLEPT IN A SHELTER (INCLUDING NOW)?: NO

## 2023-03-16 SDOH — ECONOMIC STABILITY: INCOME INSECURITY: HOW HARD IS IT FOR YOU TO PAY FOR THE VERY BASICS LIKE FOOD, HOUSING, MEDICAL CARE, AND HEATING?: NOT VERY HARD

## 2023-03-16 SDOH — ECONOMIC STABILITY: FOOD INSECURITY: WITHIN THE PAST 12 MONTHS, THE FOOD YOU BOUGHT JUST DIDN'T LAST AND YOU DIDN'T HAVE MONEY TO GET MORE.: NEVER TRUE

## 2023-03-16 ASSESSMENT — PATIENT HEALTH QUESTIONNAIRE - PHQ9
SUM OF ALL RESPONSES TO PHQ9 QUESTIONS 1 & 2: 0
SUM OF ALL RESPONSES TO PHQ QUESTIONS 1-9: 0
SUM OF ALL RESPONSES TO PHQ QUESTIONS 1-9: 0
2. FEELING DOWN, DEPRESSED OR HOPELESS: 0
SUM OF ALL RESPONSES TO PHQ QUESTIONS 1-9: 0
SUM OF ALL RESPONSES TO PHQ QUESTIONS 1-9: 0
1. LITTLE INTEREST OR PLEASURE IN DOING THINGS: 0

## 2023-03-16 ASSESSMENT — ENCOUNTER SYMPTOMS
COUGH: 0
ABDOMINAL PAIN: 0
SHORTNESS OF BREATH: 0

## 2023-03-16 NOTE — PROGRESS NOTES
Linus Solano  1955  79 y.o.  female    SUBJECTIVE:    Chief Complaint   Patient presents with    Follow-up     Here for 4 month f/u    Other     Having issues with her R ear. States that she is not seeing ear wax- States that it is white in color       HPI  Pt here today for recheck. Overall doing well. Asthma-extremely well controlled, pt unsure of last time she actually had to use rescue inhaler. Obesity-pt has been working on diet/wt loss. Was following at bariatric clinic towards end of 2022, used adipex with good results. Can not resume until June, 2023. Continues to work on [employer]. Right ear pain-recurrent, chronic for approx 6 years. Started after having ear flushed for wax. Some hearing loss noted bilaterally but right worse than left. Pt states she gets a crackling noise in ears at times. Sometimes sensation of pressure noted. Reports having a white/light colored waxy discharge at times. PHQ Scores 3/16/2023 11/28/2022 11/14/2022 8/29/2022 8/16/2022 7/14/2022 3/1/2022   PHQ2 Score 0 0 0 0 0 0 0   PHQ9 Score 0 0 0 0 0 0 0     Interpretation of Total Score Depression Severity: 1-4 = Minimal depression, 5-9 = Mild depression, 10-14 = Moderate depression, 15-19 = Moderately severe depression, 20-27 = Severe depression     Current Outpatient Medications on File Prior to Visit   Medication Sig Dispense Refill    UNABLE TO FIND AREDS supplement - for eyes. BID      Cholecalciferol (VITAMIN D3) 1.25 MG (37193 UT) TABS Take by mouth      albuterol sulfate  (90 Base) MCG/ACT inhaler Inhale 2 puffs into the lungs every 6 hours as needed for Wheezing 1 g 5    Red Yeast Rice Extract (RED YEAST RICE PO) Take by mouth       VITAMIN D PO Take by mouth daily  (Patient not taking: No sig reported)       No current facility-administered medications on file prior to visit.        Allergies   Allergen Reactions    Aleve [Naproxen] Hives       Past Medical History:   Diagnosis Date    Asthma     Basal cell carcinoma 04/15/2021    History of echocardiogram 01/27/2021    EF 55-60%, E/A reversal indterminate diastolic function, no significant valvular abnormalities, no pericardial effusion    History of exercise stress test 01/27/2021    Treadmill, normal    Muscle spasm 1/18/2021    Shortness of breath 1/18/2021       Past Surgical History:   Procedure Laterality Date    CHOLECYSTECTOMY      HYSTERECTOMY (CERVIX STATUS UNKNOWN)      OVARY REMOVAL      age 39       Social History     Socioeconomic History    Marital status:      Spouse name: None    Number of children: None    Years of education: None    Highest education level: None   Tobacco Use    Smoking status: Never    Smokeless tobacco: Never   Substance and Sexual Activity    Alcohol use: Not Currently     Alcohol/week: 0.0 standard drinks    Drug use: Never    Sexual activity: Yes     Partners: Male     Social Determinants of Health     Financial Resource Strain: Low Risk     Difficulty of Paying Living Expenses: Not very hard   Food Insecurity: No Food Insecurity    Worried About Running Out of Food in the Last Year: Never true    Ran Out of Food in the Last Year: Never true   Transportation Needs: Unknown    Lack of Transportation (Non-Medical): No   Physical Activity: Inactive    Days of Exercise per Week: 0 days    Minutes of Exercise per Session: 0 min   Housing Stability: Unknown    Unstable Housing in the Last Year: No       Review of Systems   Constitutional:  Negative for chills and fever. HENT:  Positive for ear discharge, ear pain and hearing loss. Respiratory:  Negative for cough and shortness of breath. Cardiovascular:  Negative for chest pain. Gastrointestinal:  Negative for abdominal pain. Musculoskeletal: Negative. Skin: Negative. Neurological: Negative. Psychiatric/Behavioral: Negative.        OBJECTIVE:    /76 (Site: Left Upper Arm, Position: Sitting, Cuff Size: Medium Adult) Pulse 68   Temp 97.4 °F (36.3 °C)   Resp 18   Wt 184 lb 6.4 oz (83.6 kg)   SpO2 98%   BMI 31.65 kg/m²     Physical Exam  Vitals reviewed. Constitutional:       General: She is not in acute distress. Appearance: Normal appearance. She is obese. HENT:      Right Ear: External ear normal. Tympanic membrane is bulging. Tympanic membrane is not injected, perforated or erythematous. Left Ear: External ear normal. Tympanic membrane is scarred and bulging. Tympanic membrane is not injected, perforated or erythematous. Nose: Nose normal.   Eyes:      Extraocular Movements: Extraocular movements intact. Cardiovascular:      Rate and Rhythm: Normal rate and regular rhythm. Heart sounds: Normal heart sounds. Pulmonary:      Effort: Pulmonary effort is normal.      Breath sounds: Normal breath sounds. Skin:     General: Skin is warm and dry. Neurological:      Mental Status: She is alert and oriented to person, place, and time. Gautam Buckner:    Problem List          Respiratory    Mild intermittent asthma without complication - Primary      Well-controlled, continue current medications         Relevant Medications    albuterol sulfate  (90 Base) MCG/ACT inhaler       Nervous and Auditory    Dysfunction of both eustachian tubes      flonase nasal spray as directed, can try sudafed prn only if needed. Other    BMI 31.0-31.9,adult     Reviewed intermittent fasting plans/Weight Watchers/other dietary recommendations, exercise/increased activity for weight loss                  Return in about 4 months (around 7/16/2023) for medicare wellness.

## 2023-03-17 NOTE — ASSESSMENT & PLAN NOTE
Reviewed intermittent fasting plans/Weight Watchers/other dietary recommendations, exercise/increased activity for weight loss

## 2023-05-08 ENCOUNTER — TELEPHONE (OUTPATIENT)
Dept: FAMILY MEDICINE CLINIC | Age: 68
End: 2023-05-08

## 2023-05-08 NOTE — TELEPHONE ENCOUNTER
Client called to ask if PCP would consider ordering her Adipex. Client reports recent change does no longer indicate need for a six month cease of taking medication. Client called to get into weight clinic to ask for another script and they are booked until 8/11/23. Staff at the clinic informed this client that some providers will prescribe this medication. Client has been off the medication for about three and a half months and is starting to notice the struggle to maintain weight loss without medication. Please advise. [facility] on [address] if script sent.

## 2023-07-24 ENCOUNTER — OFFICE VISIT (OUTPATIENT)
Dept: BARIATRICS/WEIGHT MGMT | Age: 68
End: 2023-07-24
Payer: MEDICARE

## 2023-07-24 VITALS
DIASTOLIC BLOOD PRESSURE: 72 MMHG | SYSTOLIC BLOOD PRESSURE: 126 MMHG | WEIGHT: 196.6 LBS | HEART RATE: 73 BPM | BODY MASS INDEX: 33.57 KG/M2 | OXYGEN SATURATION: 100 % | HEIGHT: 64 IN

## 2023-07-24 DIAGNOSIS — Z79.899 MEDICATION MANAGEMENT: ICD-10-CM

## 2023-07-24 DIAGNOSIS — E66.9 OBESITY (BMI 30-39.9): Primary | ICD-10-CM

## 2023-07-24 PROCEDURE — 1123F ACP DISCUSS/DSCN MKR DOCD: CPT | Performed by: NURSE PRACTITIONER

## 2023-07-24 PROCEDURE — 80305 DRUG TEST PRSMV DIR OPT OBS: CPT | Performed by: NURSE PRACTITIONER

## 2023-07-24 PROCEDURE — 99214 OFFICE O/P EST MOD 30 MIN: CPT | Performed by: NURSE PRACTITIONER

## 2023-07-24 RX ORDER — PHENTERMINE HYDROCHLORIDE 37.5 MG/1
37.5 TABLET ORAL
Qty: 30 TABLET | Refills: 0 | Status: SHIPPED | OUTPATIENT
Start: 2023-07-24 | End: 2023-08-23

## 2023-07-24 ASSESSMENT — PATIENT HEALTH QUESTIONNAIRE - PHQ9
SUM OF ALL RESPONSES TO PHQ9 QUESTIONS 1 & 2: 0
SUM OF ALL RESPONSES TO PHQ QUESTIONS 1-9: 0
1. LITTLE INTEREST OR PLEASURE IN DOING THINGS: 0
SUM OF ALL RESPONSES TO PHQ QUESTIONS 1-9: 0
2. FEELING DOWN, DEPRESSED OR HOPELESS: 0
SUM OF ALL RESPONSES TO PHQ QUESTIONS 1-9: 0
SUM OF ALL RESPONSES TO PHQ QUESTIONS 1-9: 0

## 2023-08-24 ENCOUNTER — OFFICE VISIT (OUTPATIENT)
Dept: BARIATRICS/WEIGHT MGMT | Age: 68
End: 2023-08-24
Payer: MEDICARE

## 2023-08-24 VITALS
SYSTOLIC BLOOD PRESSURE: 124 MMHG | BODY MASS INDEX: 32.87 KG/M2 | WEIGHT: 192.5 LBS | HEART RATE: 78 BPM | DIASTOLIC BLOOD PRESSURE: 72 MMHG | OXYGEN SATURATION: 95 % | HEIGHT: 64 IN

## 2023-08-24 DIAGNOSIS — E66.9 OBESITY (BMI 30-39.9): Primary | ICD-10-CM

## 2023-08-24 DIAGNOSIS — Z79.899 MEDICATION MANAGEMENT: ICD-10-CM

## 2023-08-24 PROCEDURE — 99214 OFFICE O/P EST MOD 30 MIN: CPT | Performed by: NURSE PRACTITIONER

## 2023-08-24 PROCEDURE — 1123F ACP DISCUSS/DSCN MKR DOCD: CPT | Performed by: NURSE PRACTITIONER

## 2023-08-24 RX ORDER — PHENTERMINE HYDROCHLORIDE 37.5 MG/1
37.5 CAPSULE ORAL EVERY MORNING
COMMUNITY

## 2023-08-24 RX ORDER — PHENTERMINE HYDROCHLORIDE 37.5 MG/1
37.5 TABLET ORAL
Qty: 30 TABLET | Refills: 0 | Status: SHIPPED | OUTPATIENT
Start: 2023-08-24 | End: 2023-09-23

## 2023-08-24 ASSESSMENT — PATIENT HEALTH QUESTIONNAIRE - PHQ9
2. FEELING DOWN, DEPRESSED OR HOPELESS: 0
SUM OF ALL RESPONSES TO PHQ QUESTIONS 1-9: 0
1. LITTLE INTEREST OR PLEASURE IN DOING THINGS: 0
SUM OF ALL RESPONSES TO PHQ9 QUESTIONS 1 & 2: 0

## 2023-08-24 NOTE — PROGRESS NOTES
-Pt is aware that in order to be successful, must combine Adipex with appropriate diet and exercise plan. -Pt aware must continue to meet monthly in person while on this medication. F/u in one month. -Check [place] Automated Rx Reporting System. Any concerns: NONE Reported     - Current birth control measures include: hysterectomy    -If elderly or renal impairment, will use lower dose (15 mg daily) and avoid all together if GFR is less than 15. N/A      No orders of the defined types were placed in this encounter. Orders Placed This Encounter   Medications    phentermine (ADIPEX-P) 37.5 MG tablet     Sig: Take 1 tablet by mouth every morning (before breakfast) for 30 days. BMI 44. Max Daily Amount: 37.5 mg     Dispense:  30 tablet     Refill:  0        Follow Up:  Return in about 1 month (around 9/24/2023).       Stephen Lombardo, APRN - CNP

## 2023-09-11 ENCOUNTER — OFFICE VISIT (OUTPATIENT)
Dept: FAMILY MEDICINE CLINIC | Age: 68
End: 2023-09-11
Payer: MEDICARE

## 2023-09-11 VITALS
RESPIRATION RATE: 16 BRPM | HEART RATE: 74 BPM | DIASTOLIC BLOOD PRESSURE: 74 MMHG | SYSTOLIC BLOOD PRESSURE: 122 MMHG | BODY MASS INDEX: 32.44 KG/M2 | WEIGHT: 189 LBS | OXYGEN SATURATION: 97 %

## 2023-09-11 DIAGNOSIS — Z12.31 ENCOUNTER FOR SCREENING MAMMOGRAM FOR BREAST CANCER: ICD-10-CM

## 2023-09-11 DIAGNOSIS — Z00.00 MEDICARE ANNUAL WELLNESS VISIT, SUBSEQUENT: Primary | ICD-10-CM

## 2023-09-11 DIAGNOSIS — R73.03 PREDIABETES: ICD-10-CM

## 2023-09-11 LAB — HBA1C MFR BLD: 5.8 %

## 2023-09-11 PROCEDURE — G0439 PPPS, SUBSEQ VISIT: HCPCS | Performed by: PHYSICIAN ASSISTANT

## 2023-09-11 PROCEDURE — 83036 HEMOGLOBIN GLYCOSYLATED A1C: CPT | Performed by: PHYSICIAN ASSISTANT

## 2023-09-11 PROCEDURE — 1123F ACP DISCUSS/DSCN MKR DOCD: CPT | Performed by: PHYSICIAN ASSISTANT

## 2023-09-11 ASSESSMENT — PATIENT HEALTH QUESTIONNAIRE - PHQ9
SUM OF ALL RESPONSES TO PHQ QUESTIONS 1-9: 0
1. LITTLE INTEREST OR PLEASURE IN DOING THINGS: 0
SUM OF ALL RESPONSES TO PHQ9 QUESTIONS 1 & 2: 0
SUM OF ALL RESPONSES TO PHQ QUESTIONS 1-9: 0
SUM OF ALL RESPONSES TO PHQ QUESTIONS 1-9: 0
2. FEELING DOWN, DEPRESSED OR HOPELESS: 0
SUM OF ALL RESPONSES TO PHQ QUESTIONS 1-9: 0

## 2023-09-11 NOTE — PATIENT INSTRUCTIONS
We are committed to providing you the best care possible. If you receive a survey after visiting one of our offices, please take time to share your experience concerning your physician office visit. These surveys are confidential and no health information about you is shared. We are eager to improve for you and continue to give you satisfactory care, we are counting on your feedback to help make that happen. Welcome to [address] [place] [address] and Pediatrics:    Did you know we now have a faster way for you to move through your appointment? For your convenience, we now have digital registration available. When you schedule your next appointment, you will receive a link via your email as well as a text message that will allow you to complete any paperwork digitally before your appointment. Learning About Stress  What is stress? Stress is your body's response to a hard situation. Your body can have a physical, emotional, or mental response. Stress is a fact of life for most people, and it affects everyone differently. What causes stress for you may not be stressful for someone else. A lot of things can cause stress. You may feel stress when you go on a job interview, take a test, or run a race. This kind of short-term stress is normal and even useful. It can help you if you need to work hard or react quickly. For example, stress can help you finish an important job on time. Long-term stress is caused by ongoing stressful situations or events. Examples of long-term stress include long-term health problems, ongoing problems at work, or conflicts in your family. Long-term stress can harm your health. How does stress affect your health? When you are stressed, your body responds as though you are in danger. It makes hormones that speed up your heart, make you breathe faster, and give you a burst of energy. This is called the fight-or-flight stress response.  If the stress is over quickly, your

## 2023-09-11 NOTE — PROGRESS NOTES
Patient Care Team:  Bernadine De Paz PA-C as PCP - General (Physician Assistant)  Bernadine De Paz PA-C as PCP - Empaneled Provider     Reviewed and updated this visit:  Tobacco  Allergies  Meds  Problems  Med Hx  Surg Hx  Soc Hx  Fam Hx

## 2023-09-12 PROBLEM — Z00.00 MEDICARE ANNUAL WELLNESS VISIT, SUBSEQUENT: Status: ACTIVE | Noted: 2023-09-12

## 2023-09-12 PROBLEM — Z12.31 ENCOUNTER FOR SCREENING MAMMOGRAM FOR BREAST CANCER: Status: ACTIVE | Noted: 2023-09-12

## 2023-09-20 ENCOUNTER — APPOINTMENT (OUTPATIENT)
Dept: GENERAL RADIOLOGY | Age: 68
End: 2023-09-20
Payer: MEDICARE

## 2023-09-20 ENCOUNTER — APPOINTMENT (OUTPATIENT)
Dept: CT IMAGING | Age: 68
End: 2023-09-20
Payer: MEDICARE

## 2023-09-20 ENCOUNTER — HOSPITAL ENCOUNTER (OUTPATIENT)
Age: 68
Setting detail: OBSERVATION
Discharge: HOME | End: 2023-09-22
Attending: EMERGENCY MEDICINE | Admitting: INTERNAL MEDICINE
Payer: MEDICARE

## 2023-09-20 DIAGNOSIS — R42 DIZZINESS: Primary | ICD-10-CM

## 2023-09-20 LAB
ALBUMIN SERPL-MCNC: 4.3 GM/DL (ref 3.4–5)
ALP BLD-CCNC: 81 IU/L (ref 40–129)
ALT SERPL-CCNC: 16 U/L (ref 10–40)
ANION GAP SERPL CALCULATED.3IONS-SCNC: 12 MMOL/L (ref 4–16)
AST SERPL-CCNC: 17 IU/L (ref 15–37)
BASOPHILS ABSOLUTE: 0 K/CU MM
BASOPHILS RELATIVE PERCENT: 0.7 % (ref 0–1)
BILIRUB SERPL-MCNC: 0.5 MG/DL (ref 0–1)
BUN SERPL-MCNC: 15 MG/DL (ref 6–23)
CALCIUM SERPL-MCNC: 9.1 MG/DL (ref 8.3–10.6)
CHLORIDE BLD-SCNC: 106 MMOL/L (ref 99–110)
CHP ED QC CHECK: 110
CO2: 22 MMOL/L (ref 21–32)
CREAT SERPL-MCNC: 0.5 MG/DL (ref 0.6–1.1)
DIFFERENTIAL TYPE: ABNORMAL
EOSINOPHILS ABSOLUTE: 0.1 K/CU MM
EOSINOPHILS RELATIVE PERCENT: 1.1 % (ref 0–3)
GFR SERPL CREATININE-BSD FRML MDRD: >60 ML/MIN/1.73M2
GLUCOSE BLD-MCNC: 110 MG/DL (ref 70–99)
GLUCOSE SERPL-MCNC: 119 MG/DL (ref 70–99)
HCT VFR BLD CALC: 39.9 % (ref 37–47)
HEMOGLOBIN: 12.8 GM/DL (ref 12.5–16)
IMMATURE NEUTROPHIL %: 0.4 % (ref 0–0.43)
INR BLD: 0.8 INDEX
LYMPHOCYTES ABSOLUTE: 1.7 K/CU MM
LYMPHOCYTES RELATIVE PERCENT: 30.6 % (ref 24–44)
MCH RBC QN AUTO: 27.7 PG (ref 27–31)
MCHC RBC AUTO-ENTMCNC: 32.1 % (ref 32–36)
MCV RBC AUTO: 86.4 FL (ref 78–100)
MONOCYTES ABSOLUTE: 0.5 K/CU MM
MONOCYTES RELATIVE PERCENT: 9.1 % (ref 0–4)
PDW BLD-RTO: 13.3 % (ref 11.7–14.9)
PLATELET # BLD: 241 K/CU MM (ref 140–440)
PMV BLD AUTO: 10.7 FL (ref 7.5–11.1)
POTASSIUM SERPL-SCNC: 4.2 MMOL/L (ref 3.5–5.1)
PROTHROMBIN TIME: 12.2 SECONDS (ref 11.7–14.5)
RBC # BLD: 4.62 M/CU MM (ref 4.2–5.4)
SEGMENTED NEUTROPHILS ABSOLUTE COUNT: 3.3 K/CU MM
SEGMENTED NEUTROPHILS RELATIVE PERCENT: 58.1 % (ref 36–66)
SODIUM BLD-SCNC: 140 MMOL/L (ref 135–145)
TOTAL IMMATURE NEUTOROPHIL: 0.02 K/CU MM
TOTAL PROTEIN: 7.1 GM/DL (ref 6.4–8.2)
TROPONIN T: <0.01 NG/ML
WBC # BLD: 5.6 K/CU MM (ref 4–10.5)

## 2023-09-20 PROCEDURE — 80053 COMPREHEN METABOLIC PANEL: CPT

## 2023-09-20 PROCEDURE — 82962 GLUCOSE BLOOD TEST: CPT

## 2023-09-20 PROCEDURE — 6360000002 HC RX W HCPCS: Performed by: EMERGENCY MEDICINE

## 2023-09-20 PROCEDURE — 85025 COMPLETE CBC W/AUTO DIFF WBC: CPT

## 2023-09-20 PROCEDURE — 6360000004 HC RX CONTRAST MEDICATION: Performed by: EMERGENCY MEDICINE

## 2023-09-20 PROCEDURE — 2580000003 HC RX 258: Performed by: EMERGENCY MEDICINE

## 2023-09-20 PROCEDURE — 99285 EMERGENCY DEPT VISIT HI MDM: CPT

## 2023-09-20 PROCEDURE — 71045 X-RAY EXAM CHEST 1 VIEW: CPT

## 2023-09-20 PROCEDURE — 84484 ASSAY OF TROPONIN QUANT: CPT

## 2023-09-20 PROCEDURE — 93005 ELECTROCARDIOGRAM TRACING: CPT | Performed by: EMERGENCY MEDICINE

## 2023-09-20 PROCEDURE — 96374 THER/PROPH/DIAG INJ IV PUSH: CPT

## 2023-09-20 PROCEDURE — G0378 HOSPITAL OBSERVATION PER HR: HCPCS

## 2023-09-20 PROCEDURE — 85610 PROTHROMBIN TIME: CPT

## 2023-09-20 PROCEDURE — 70498 CT ANGIOGRAPHY NECK: CPT

## 2023-09-20 PROCEDURE — 6370000000 HC RX 637 (ALT 250 FOR IP): Performed by: EMERGENCY MEDICINE

## 2023-09-20 PROCEDURE — 70450 CT HEAD/BRAIN W/O DYE: CPT

## 2023-09-20 RX ORDER — ONDANSETRON 2 MG/ML
4 INJECTION INTRAMUSCULAR; INTRAVENOUS EVERY 6 HOURS PRN
Status: DISCONTINUED | OUTPATIENT
Start: 2023-09-20 | End: 2023-09-21 | Stop reason: SDUPTHER

## 2023-09-20 RX ORDER — MECLIZINE HCL 12.5 MG/1
50 TABLET ORAL ONCE
Status: COMPLETED | OUTPATIENT
Start: 2023-09-20 | End: 2023-09-20

## 2023-09-20 RX ORDER — 0.9 % SODIUM CHLORIDE 0.9 %
1000 INTRAVENOUS SOLUTION INTRAVENOUS ONCE
Status: COMPLETED | OUTPATIENT
Start: 2023-09-20 | End: 2023-09-20

## 2023-09-20 RX ORDER — ASPIRIN 81 MG/1
324 TABLET, CHEWABLE ORAL ONCE
Status: COMPLETED | OUTPATIENT
Start: 2023-09-20 | End: 2023-09-20

## 2023-09-20 RX ORDER — SODIUM CHLORIDE 0.9 % (FLUSH) 0.9 %
20 SYRINGE (ML) INJECTION
Status: COMPLETED | OUTPATIENT
Start: 2023-09-20 | End: 2023-09-20

## 2023-09-20 RX ADMIN — SODIUM CHLORIDE, PRESERVATIVE FREE 20 ML: 5 INJECTION INTRAVENOUS at 15:11

## 2023-09-20 RX ADMIN — IOPAMIDOL 85 ML: 755 INJECTION, SOLUTION INTRAVENOUS at 15:10

## 2023-09-20 RX ADMIN — ASPIRIN 81 MG CHEWABLE TABLET 324 MG: 81 TABLET CHEWABLE at 16:15

## 2023-09-20 RX ADMIN — ONDANSETRON 4 MG: 2 INJECTION INTRAMUSCULAR; INTRAVENOUS at 15:22

## 2023-09-20 RX ADMIN — MECLIZINE 50 MG: 12.5 TABLET ORAL at 16:15

## 2023-09-20 RX ADMIN — SODIUM CHLORIDE 1000 ML: 9 INJECTION, SOLUTION INTRAVENOUS at 16:15

## 2023-09-20 ASSESSMENT — PAIN - FUNCTIONAL ASSESSMENT: PAIN_FUNCTIONAL_ASSESSMENT: 0-10

## 2023-09-20 ASSESSMENT — PAIN DESCRIPTION - DESCRIPTORS: DESCRIPTORS: ACHING

## 2023-09-20 ASSESSMENT — LIFESTYLE VARIABLES
HOW MANY STANDARD DRINKS CONTAINING ALCOHOL DO YOU HAVE ON A TYPICAL DAY: PATIENT DOES NOT DRINK
HOW OFTEN DO YOU HAVE A DRINK CONTAINING ALCOHOL: NEVER

## 2023-09-20 ASSESSMENT — PAIN DESCRIPTION - ORIENTATION: ORIENTATION: RIGHT

## 2023-09-20 ASSESSMENT — PAIN DESCRIPTION - PAIN TYPE: TYPE: ACUTE PAIN

## 2023-09-20 ASSESSMENT — PAIN DESCRIPTION - FREQUENCY: FREQUENCY: CONTINUOUS

## 2023-09-20 ASSESSMENT — PAIN DESCRIPTION - LOCATION: LOCATION: RIB CAGE

## 2023-09-20 NOTE — ED PROVIDER NOTES
Triage Chief Complaint:   Dizziness (Reports of the sudden onset of dizziness this afternoon.   )    [place]:  Lorne Gonzalez is a 76 y.o. female that presents with dizziness. Patient was in baseline state of health until 30 minutes prior to arrival when she was sitting in her car looking at her phone. Patient's last known well is reported as 1430, per patient. Patient reports profound dizziness and spinning sensation. Dizziness is worse with position changes and looking around and improved with holding still in closing eyes. No numbness or weakness. No vision changes. No difficulty with speech or understanding. No pain at this time other than rib pain where she fell several days ago. Patient has never had symptoms like this before. No anticoagulation use. Patient denies any known medical problems. ROS:  At least 10 systems reviewed and otherwise acutely negative except as in the [address]     Past Medical History:   Diagnosis Date    Asthma     Basal cell carcinoma 04/15/2021    History of echocardiogram 01/27/2021    EF 55-60%, E/A reversal indterminate diastolic function, no significant valvular abnormalities, no pericardial effusion    History of exercise stress test 01/27/2021    Treadmill, normal    Muscle spasm 1/18/2021    Shortness of breath 1/18/2021     Past Surgical History:   Procedure Laterality Date    CHOLECYSTECTOMY      HYSTERECTOMY (CERVIX STATUS UNKNOWN)      OVARY REMOVAL      age 39     Family History   Problem Relation Age of Onset    Heart Surgery Mother     Hypertension Mother     Heart Surgery Father      Social History     Socioeconomic History    Marital status:      Spouse name: Not on file    Number of children: Not on file    Years of education: Not on file    Highest education level: Not on file   Occupational History    Not on file   Tobacco Use    Smoking status: Never    Smokeless tobacco: Never   Vaping Use    Vaping Use: Never used   Substance and Sexual abnormality of the salivary and thyroid glands. BONES: No acute osseous abnormality. CTA HEAD: ANTERIOR CIRCULATION: There is 30% stenosis in the mid M1 segments of the bilateral MCAs. No significant stenosis of the intracranial internal carotid, anterior cerebral, or middle cerebral arteries. No aneurysm. POSTERIOR CIRCULATION: No significant stenosis of the vertebral, basilar, or posterior cerebral arteries. No aneurysm. OTHER: No dural venous sinus thrombosis on this non-dedicated study. BRAIN: No mass effect or midline shift. No extra-axial fluid collection. The gray-white differentiation is maintained. No acute abnormality or flow-limiting stenosis of the major arteries of the head and neck. 30% stenosis in the mid M1 segments of the bilateral MCAs. XR CHEST PORTABLE    Result Date: 9/20/2023  EXAMINATION: ONE XRAY VIEW OF THE CHEST 9/20/2023 2:56 pm COMPARISON: 05/11/2022 HISTORY: ORDERING SYSTEM PROVIDED HISTORY: stroke symptoms TECHNOLOGIST PROVIDED HISTORY: Reason for exam:->stroke symptoms Reason for Exam: stroke symptoms Additional signs and symptoms: dizziness FINDINGS: The lungs are without acute focal process. There is no effusion or pneumothorax. The cardiomediastinal silhouette is stable. The osseous structures are stable. No acute process. CT HEAD WO CONTRAST    Result Date: 9/20/2023  EXAMINATION: CT OF THE HEAD WITHOUT CONTRAST  9/20/2023 2:56 pm TECHNIQUE: CT of the head was performed without the administration of intravenous contrast. Automated exposure control, iterative reconstruction, and/or weight based adjustment of the mA/kV was utilized to reduce the radiation dose to as low as reasonably achievable. COMPARISON: None. HISTORY: ORDERING SYSTEM PROVIDED HISTORY: Stroke Symptoms TECHNOLOGIST PROVIDED HISTORY: Has a \"code stroke\" or \"stroke alert\" been called? ->Yes Reason for exam:->Stroke Symptoms Decision Support Exception - unselect if not a suspected or confirmed

## 2023-09-20 NOTE — PROGRESS NOTES
Stroke   Eval68  Asthma    Sudden onset dizzy unproved  Cva alert  Cta ok  Not tnk candidate  No hx vertigo  Seems peripehral  Mri   Echo          Fc  No covid concern  Bls ok  No motor  No invasive devices  [phone]

## 2023-09-21 ENCOUNTER — APPOINTMENT (OUTPATIENT)
Dept: MRI IMAGING | Age: 68
End: 2023-09-21
Payer: MEDICARE

## 2023-09-21 LAB
CHOLEST SERPL-MCNC: 166 MG/DL
EKG ATRIAL RATE: 68 BPM
EKG DIAGNOSIS: NORMAL
EKG P AXIS: 23 DEGREES
EKG P-R INTERVAL: 116 MS
EKG Q-T INTERVAL: 406 MS
EKG QRS DURATION: 68 MS
EKG QTC CALCULATION (BAZETT): 431 MS
EKG R AXIS: 33 DEGREES
EKG T AXIS: 54 DEGREES
EKG VENTRICULAR RATE: 68 BPM
ESTIMATED AVERAGE GLUCOSE: 131 MG/DL
HBA1C MFR BLD: 6.2 % (ref 4.2–6.3)
HDLC SERPL-MCNC: 40 MG/DL
LDLC SERPL CALC-MCNC: 97 MG/DL
TRIGL SERPL-MCNC: 144 MG/DL

## 2023-09-21 PROCEDURE — 99223 1ST HOSP IP/OBS HIGH 75: CPT | Performed by: NURSE PRACTITIONER

## 2023-09-21 PROCEDURE — 93306 TTE W/DOPPLER COMPLETE: CPT

## 2023-09-21 PROCEDURE — 97166 OT EVAL MOD COMPLEX 45 MIN: CPT

## 2023-09-21 PROCEDURE — 94761 N-INVAS EAR/PLS OXIMETRY MLT: CPT

## 2023-09-21 PROCEDURE — 70551 MRI BRAIN STEM W/O DYE: CPT

## 2023-09-21 PROCEDURE — 36415 COLL VENOUS BLD VENIPUNCTURE: CPT

## 2023-09-21 PROCEDURE — 2580000003 HC RX 258: Performed by: INTERNAL MEDICINE

## 2023-09-21 PROCEDURE — 83036 HEMOGLOBIN GLYCOSYLATED A1C: CPT

## 2023-09-21 PROCEDURE — 96372 THER/PROPH/DIAG INJ SC/IM: CPT

## 2023-09-21 PROCEDURE — G0378 HOSPITAL OBSERVATION PER HR: HCPCS

## 2023-09-21 PROCEDURE — 97530 THERAPEUTIC ACTIVITIES: CPT

## 2023-09-21 PROCEDURE — 1200000000 HC SEMI PRIVATE

## 2023-09-21 PROCEDURE — 6370000000 HC RX 637 (ALT 250 FOR IP): Performed by: INTERNAL MEDICINE

## 2023-09-21 PROCEDURE — 97165 OT EVAL LOW COMPLEX 30 MIN: CPT

## 2023-09-21 PROCEDURE — 80061 LIPID PANEL: CPT

## 2023-09-21 PROCEDURE — 92610 EVALUATE SWALLOWING FUNCTION: CPT

## 2023-09-21 PROCEDURE — 93010 ELECTROCARDIOGRAM REPORT: CPT | Performed by: INTERNAL MEDICINE

## 2023-09-21 PROCEDURE — 6360000002 HC RX W HCPCS: Performed by: INTERNAL MEDICINE

## 2023-09-21 RX ORDER — POLYETHYLENE GLYCOL 3350 17 G/17G
17 POWDER, FOR SOLUTION ORAL DAILY PRN
Status: DISCONTINUED | OUTPATIENT
Start: 2023-09-21 | End: 2023-09-22 | Stop reason: HOSPADM

## 2023-09-21 RX ORDER — MECLIZINE HYDROCHLORIDE 25 MG/1
25 TABLET ORAL 3 TIMES DAILY PRN
Status: DISCONTINUED | OUTPATIENT
Start: 2023-09-21 | End: 2023-09-22 | Stop reason: HOSPADM

## 2023-09-21 RX ORDER — ASPIRIN 300 MG/1
300 SUPPOSITORY RECTAL DAILY
Status: DISCONTINUED | OUTPATIENT
Start: 2023-09-22 | End: 2023-09-22 | Stop reason: HOSPADM

## 2023-09-21 RX ORDER — ONDANSETRON 2 MG/ML
4 INJECTION INTRAMUSCULAR; INTRAVENOUS EVERY 6 HOURS PRN
Status: DISCONTINUED | OUTPATIENT
Start: 2023-09-21 | End: 2023-09-22 | Stop reason: HOSPADM

## 2023-09-21 RX ORDER — SODIUM CHLORIDE 0.9 % (FLUSH) 0.9 %
5-40 SYRINGE (ML) INJECTION PRN
Status: DISCONTINUED | OUTPATIENT
Start: 2023-09-21 | End: 2023-09-22 | Stop reason: HOSPADM

## 2023-09-21 RX ORDER — SODIUM CHLORIDE 9 MG/ML
INJECTION, SOLUTION INTRAVENOUS PRN
Status: DISCONTINUED | OUTPATIENT
Start: 2023-09-21 | End: 2023-09-22 | Stop reason: HOSPADM

## 2023-09-21 RX ORDER — ENOXAPARIN SODIUM 100 MG/ML
40 INJECTION SUBCUTANEOUS DAILY
Status: DISCONTINUED | OUTPATIENT
Start: 2023-09-21 | End: 2023-09-22 | Stop reason: HOSPADM

## 2023-09-21 RX ORDER — LABETALOL HYDROCHLORIDE 5 MG/ML
10 INJECTION, SOLUTION INTRAVENOUS EVERY 10 MIN PRN
Status: DISCONTINUED | OUTPATIENT
Start: 2023-09-21 | End: 2023-09-22 | Stop reason: HOSPADM

## 2023-09-21 RX ORDER — ASPIRIN 81 MG/1
81 TABLET, CHEWABLE ORAL DAILY
Status: DISCONTINUED | OUTPATIENT
Start: 2023-09-22 | End: 2023-09-22 | Stop reason: HOSPADM

## 2023-09-21 RX ORDER — SODIUM CHLORIDE 0.9 % (FLUSH) 0.9 %
5-40 SYRINGE (ML) INJECTION EVERY 12 HOURS SCHEDULED
Status: DISCONTINUED | OUTPATIENT
Start: 2023-09-21 | End: 2023-09-22 | Stop reason: HOSPADM

## 2023-09-21 RX ORDER — ONDANSETRON 4 MG/1
4 TABLET, ORALLY DISINTEGRATING ORAL EVERY 8 HOURS PRN
Status: DISCONTINUED | OUTPATIENT
Start: 2023-09-21 | End: 2023-09-22 | Stop reason: HOSPADM

## 2023-09-21 RX ORDER — ATORVASTATIN CALCIUM 40 MG/1
40 TABLET, FILM COATED ORAL NIGHTLY
Status: DISCONTINUED | OUTPATIENT
Start: 2023-09-21 | End: 2023-09-22 | Stop reason: HOSPADM

## 2023-09-21 RX ADMIN — SODIUM CHLORIDE, PRESERVATIVE FREE 10 ML: 5 INJECTION INTRAVENOUS at 20:56

## 2023-09-21 RX ADMIN — ATORVASTATIN CALCIUM 40 MG: 40 TABLET, FILM COATED ORAL at 20:56

## 2023-09-21 RX ADMIN — ENOXAPARIN SODIUM 40 MG: 100 INJECTION SUBCUTANEOUS at 09:11

## 2023-09-21 RX ADMIN — SODIUM CHLORIDE, PRESERVATIVE FREE 10 ML: 5 INJECTION INTRAVENOUS at 09:11

## 2023-09-21 ASSESSMENT — PAIN SCALES - GENERAL: PAINLEVEL_OUTOF10: 0

## 2023-09-21 ASSESSMENT — PAIN DESCRIPTION - LOCATION: LOCATION: ABDOMEN

## 2023-09-21 NOTE — CARE COORDINATION
09/21/23 0835   Service Assessment   Patient Orientation Alert and Oriented   Cognition Alert   History Provided By Medical Record   Primary Caregiver Self   Support Systems Spouse/Significant Other;Children   Patient's Healthcare Decision Maker is: Legal Next of Kin   PCP Verified by CM Yes   Prior Functional Level Independent in ADLs/IADLs   Current Functional Level Independent in ADLs/IADLs   Can patient return to prior living arrangement Unknown at present   Ability to make needs known: Good   Family able to assist with home care needs: Yes   Would you like for me to discuss the discharge plan with any other family members/significant others, and if so, who? No   Financial Resources [employer] None     Chart reviewed, screened for discharge planning. Pt from home. No discharge needs identified at this time.   CM following

## 2023-09-21 NOTE — H&P
light touch UE's/LE's b/l  Coordination/Cerebellum: Finger-to-Nose: no dysmetria b/l  Gait and stance: deferred for safety     PSYC  -Awake, alert, oriented x 3. Appropriate affect. Past Medical History:   Reviewed patient's past medical, surgical, social, family history and allergies. PMHx   Past Medical History:   Diagnosis Date    Asthma     Basal cell carcinoma 04/15/2021    History of echocardiogram 01/27/2021    EF 55-60%, E/A reversal indterminate diastolic function, no significant valvular abnormalities, no pericardial effusion    History of exercise stress test 01/27/2021    Treadmill, normal    Muscle spasm 1/18/2021    Shortness of breath 1/18/2021     PSHX:  has a past surgical history that includes Cholecystectomy; Hysterectomy; and Ovary removal.  Allergies: Allergies   Allergen Reactions    Aleve [Naproxen] Hives     Fam HX: family history includes Heart Surgery in her father and mother; Hypertension in her mother. Soc HX:   Social History     Socioeconomic History    Marital status:      Spouse name: None    Number of children: None    Years of education: None    Highest education level: None   Tobacco Use    Smoking status: Never    Smokeless tobacco: Never   Vaping Use    Vaping Use: Never used   Substance and Sexual Activity    Alcohol use: Not Currently     Alcohol/week: 0.0 standard drinks of alcohol    Drug use: Never    Sexual activity: Yes     Partners: Male     Social Determinants of Health     Financial Resource Strain: Low Risk  (3/16/2023)    Overall Financial Resource Strain (CARDIA)     Difficulty of Paying Living Expenses: Not very hard   Food Insecurity: No Food Insecurity (3/16/2023)    Hunger Vital Sign     Worried About Running Out of Food in the Last Year: Never true     Ran Out of Food in the Last Year: Never true   Transportation Needs: Unknown (3/16/2023)    PRAPARE - Transportation     Lack of Transportation (Non-Medical):  No   Physical Activity: Inactive symptoms: dizziness FINDINGS: The lungs are without acute focal process. There is no effusion or pneumothorax. The cardiomediastinal silhouette is stable. The osseous structures are stable. No acute process. Personally reviewed Lab Studies, Imaging. Electronically signed by Pb Garcia MD on 9/21/2023 at 1:19 AM    Comment: Please note this report has been produced using speech recognition software and may contain errors related to that system including errors in grammar, punctuation, and spelling, as well as words and phrases that may be inappropriate. If there are any questions or concerns please feel free to contact the dictating provider for clarification.

## 2023-09-21 NOTE — PROGRESS NOTES
20-22=20-39%(CJ), 15-19=40-59%(CK), 10-14=60-79%(CL), 7-9=80-99%(CM), 6=100%(CN)]     Treatment:  Therapeutic Activity Training:   Therapeutic activity training was instructed today. Cues were given for safety, sequence, UE/LE placement, awareness, and balance. Activities performed today included bed mobility training, sup-sit, sit-stand, functional mobility     Safety: Patient left in bed with bed alarm, call light within reach, RN notified, gait belt used. Assessment:  Pt is a 77 y/o female admitted from home for dizziness. Pt at baseline is independent for ADLs an for functional transfers/mobility. Pt currently presents w/ deficits relating to ADLs, IADLs, functional activity tolerance, and functional mobility. Pt would benefit from continued acute care OT services w/ discharge to home w/ HHOT    Complexity: Low  Prognosis: Good, no significant barriers to participation at this time. Occupational Therapy Plan  Times Per Week: 1x+  Times Per Day: Once a day  Current Treatment Recommendations: Balance training, Functional mobility training, Endurance training, Pain management, Safety education & training, Patient/Caregiver education & training, Equipment evaluation, education, & procurement, Positioning, Self-Care / ADL, Home management training       Goals:  Goal 1: Pt will perform UE ADLs independently   Goal 2: Pt will perform LE ADLs independently   Goal 3: Pt will perform toileting independently   Goal 4: Pt will perform [address] [facility] [address] distance functional mobility independently  in preparation for return to home   Goal 5: Pt will perform functional transfers to/from bed, chair, and toilet independently   Goal 6: Pt will perform therex/theract in order to increase functional activity tolerance    Treatment plan:  Pt will perform therex/theract in order to increase functional activity tolerance in preparation for ADL participation.      Recommendations for NURSING activity: Up to chair for all 3 meals and up to bathroom for all toileting needs    Time:   Time in: 1324  Time out: 1342  Timed treatment minutes: 8  Total time: 18 minutes     Electronically signed by:    GERARDO Damon/L [id number]  9/21/2023, 2:46 PM

## 2023-09-21 NOTE — PROGRESS NOTES
Facility/Department: [id number] [place] [id number]   CLINICAL BEDSIDE SWALLOW EVALUATION    NAME: Christen Peña  : 1955  MRN: [id number]    IMPRESSIONS AND RECOMMENDATIONS: Christen Peña was referred for a bedside swallow evaluation following admission to [facility] with dizziness, mechanical fall. Per radiologist, head CT negative for acute abnormality. MRI pending. Medical hx includes asthma. No known history of dysphagia prior to admission. Pt seen for evaluation seated upright in bed, alert, cooperative. Oral mechanism examination WFL without asymmetry. Pt presented with PO trials of thin liquids via cup/straw, puree, regular solids. Oral stage WFL with intact labial seal, mastication, oral clearance. Pharyngeal stage WFL without s/s aspiration. Speech/language screened and judged [facility]. Recommend continued regular diet/thin liquids. No further acute SLP needs identified. ADMISSION DATE: 2023  ADMITTING DIAGNOSIS: has Mild intermittent asthma without complication; Facial lesion; Other fatigue; Abnormal ECG; Palpitations; BMI 31.0-31.9,adult; Vitamin D deficiency; Prediabetes; Age-related osteoporosis without current pathological fracture; Mixed hyperlipidemia;  Pain of left heel; Dysfunction of both eustachian tubes; Medicare annual wellness visit, subsequent; Encounter for screening mammogram for breast cancer; and Dizziness on their problem list.  ONSET DATE: this admission    Recent Chest Xray/CT of Chest: see chart    Date of Eval: 2023  Evaluating Therapist: ANGELIA Lawton    Current Diet level:  Current Diet : Regular  Current Liquid Diet : Thin    Primary Complaint  denies    Pain:  Pain Assessment  Pain Assessment: None - Denies Pain  Pain Location: Rib cage  Pain Orientation: Right  Pain Descriptors: Aching  Pain Type: Acute pain  Pain Frequency: Continuous    Reason for Referral  Miri Hanley was referred for a bedside swallow evaluation to assess the efficiency of her swallow

## 2023-09-21 NOTE — CONSULTS
Neurology Service Consult Note  [address] [place] [address]   Patient Name: Rizwan Doe  : 1955        Subjective:   Reason for consult: Sudden onset dizziness  76 y.o. female with history of asthma, basal cell carcinoma, presenting to [address] [place] [address] with acute onset of room spinning dizziness. Patient was sitting in her car looking at her phone when symptoms started. No other focal or lateralizing deficits on exam. No speech changes were noted. Symptoms worse with movement or turning head and better with being still and closing eyes. Stroke alert was called and patient was evaluated by [facility] tele neurology service. NIH 0. Intervention was not recommended. Chart was reviewed in detail, patient was seen and assessed. She is resting in bed awake and alert today. Her  is at bedside. Patient tells me that last Saturday she tripped and fell over a curb. She is not sure if she struck her head she does not have any bruising however her  felt that she lost consciousness for a few seconds. Patient describes her vertiginous symptoms as room spinning. It was difficult for her to stand or attempt to walk when symptoms started. She did have associated nausea. Symptoms were worse when turning head to the left and improved if she was still and kept her eyes closed. Her symptoms have significantly improved today. She denies any headache, vision change, weakness or sensory change.   She has not had vertigo in the past.    Past Medical History:   Diagnosis Date    Asthma     Basal cell carcinoma 04/15/2021    History of echocardiogram 2021    EF 55-60%, E/A reversal indterminate diastolic function, no significant valvular abnormalities, no pericardial effusion    History of exercise stress test 2021    Treadmill, normal    Muscle spasm 2021    Shortness of breath 2021    :   Past Surgical History:   Procedure Laterality Date    CHOLECYSTECTOMY of the bilateral MCAs  MRI brain pending  Continue aspirin, atorvastatin for secondary stroke prevention pending completion of MRI. If no evidence for acute stroke on MRI from neurology standpoint, patient does not need to remain on these medications. May have meclizine 25 mg every 8 hours as needed for dizziness  PT/OT/ST as recommended  If symptoms return or persist patient will benefit from vestibular therapy at discharge  We will continue to follow loosely for MRI results. If MRI is nonacute from neurology standpoint patient is stable to discharge. Patient was discussed with attending neurologist Dr. Maria Izquierdo    Thank you for allowing us to participate in the care of your patient. If there are any questions regarding evaluation please feel free to contact us. Chepe Palma, NORA - CNP, 9/21/2023     Patient was off the floor when I rounded.  Discussed with LALITHA Godwin, DO

## 2023-09-21 NOTE — ED NOTES
Bed ready rm 0654. [facility] arranged ETA 0000. Pt and daughter updated. Pt has passed swallow screen. Pt given ice water after okayed with Dr Carolina Szymanski. Pt denies other needs @ this time.        Demetrius Burkitt, RN  09/20/23 100 [place],9D, RN  09/20/23 3405
Contacted [facility] they are on another stroke alert they will call back when they are ready to evaluate.                 Coretta Becerril RN  09/20/23 3569
Initiated stroke alert.  Pt to CT via cart with RN to accompany pt     Sonny Cates RN  09/20/23 9748
Patient passes swallow eval and provided oral medications. Pt given blanket for comfort. Denies any other needs.       Prisca Reyes RN  09/20/23 9322
Placed a second call to [facility] as we are still waiting for the stroke assessment.         Meghan Hubbard RN  09/20/23 8859
Pt awaiting admit bed to be cleaned.       Vi Kumari RN  09/20/23 0701
Pt helped to [facility]. Pt still dizzy but has improved per patient. Pt helped back to bed and back onto monitor.       Marcial Cai RN  09/20/23 2318
Pt taken to CT scan via RN stable at this time.         Maria Elena Miranda, RN  09/20/23 8716
Report called to [facility] ext 1000 [facility] Roberto Carlos Johnson, RN  09/21/23 7924
Report given to Clayton Catalan, [address]  09/21/23 5374
The patient presents to the er today by medics. The patient reports of the sudden onset of dizziness while sitting in her car at an auto repair store. Her son was with her and called medics. On arrival the patient is alert and oriented and has feeling and movement in all extremities. She answers all questions appropriately and moves all extremities on command. She does report of a fall a few days ago but does not believe that she hit her head.         Carlos Cormier RN  09/20/23 6750
The patient was updated on the bed assignment but the bed is dirty.         Kaylen Sánchez RN  09/20/23 0749
The stroke assessment was completed by Dr. Holliday Degree he is not recommending TNK but does advise a transfer to [facility] for further evaluation. Education was provided to the patient and family about the transfer process.        Antoine Sahni RN  09/20/23 7508
This nurse contacted [facility] for update on admit bed. Still awaiting bed to be cleaned and NS has put in for stat clean.      Vi Kumari RN  09/20/23 7149
Negative

## 2023-09-22 VITALS
HEART RATE: 75 BPM | SYSTOLIC BLOOD PRESSURE: 139 MMHG | RESPIRATION RATE: 21 BRPM | TEMPERATURE: 98.2 F | OXYGEN SATURATION: 96 % | HEIGHT: 64 IN | DIASTOLIC BLOOD PRESSURE: 68 MMHG | WEIGHT: 187.4 LBS | BODY MASS INDEX: 31.99 KG/M2

## 2023-09-22 PROBLEM — R42 DIZZINESS: Status: RESOLVED | Noted: 2023-09-20 | Resolved: 2023-09-22

## 2023-09-22 PROCEDURE — 6360000002 HC RX W HCPCS: Performed by: INTERNAL MEDICINE

## 2023-09-22 PROCEDURE — 99223 1ST HOSP IP/OBS HIGH 75: CPT | Performed by: NURSE PRACTITIONER

## 2023-09-22 PROCEDURE — 97116 GAIT TRAINING THERAPY: CPT

## 2023-09-22 PROCEDURE — G0378 HOSPITAL OBSERVATION PER HR: HCPCS

## 2023-09-22 PROCEDURE — 6370000000 HC RX 637 (ALT 250 FOR IP): Performed by: INTERNAL MEDICINE

## 2023-09-22 PROCEDURE — 97530 THERAPEUTIC ACTIVITIES: CPT

## 2023-09-22 PROCEDURE — 96372 THER/PROPH/DIAG INJ SC/IM: CPT

## 2023-09-22 PROCEDURE — 97162 PT EVAL MOD COMPLEX 30 MIN: CPT

## 2023-09-22 PROCEDURE — 2580000003 HC RX 258: Performed by: INTERNAL MEDICINE

## 2023-09-22 RX ORDER — ASPIRIN 81 MG/1
81 TABLET ORAL DAILY
Qty: 90 TABLET | Refills: 1 | Status: SHIPPED | OUTPATIENT
Start: 2023-09-22

## 2023-09-22 RX ORDER — MECLIZINE HYDROCHLORIDE 25 MG/1
25 TABLET ORAL 3 TIMES DAILY PRN
Qty: 30 TABLET | Refills: 1 | Status: SHIPPED | OUTPATIENT
Start: 2023-09-22 | End: 2023-10-12

## 2023-09-22 RX ADMIN — ENOXAPARIN SODIUM 40 MG: 100 INJECTION SUBCUTANEOUS at 08:25

## 2023-09-22 RX ADMIN — SODIUM CHLORIDE, PRESERVATIVE FREE 10 ML: 5 INJECTION INTRAVENOUS at 08:26

## 2023-09-22 RX ADMIN — ASPIRIN 81 MG CHEWABLE TABLET 81 MG: 81 TABLET CHEWABLE at 08:25

## 2023-09-22 NOTE — PROGRESS NOTES
Neurology Service Progress Note  [facility]   Patient Name: Paul Reza  : 1955        Subjective:   CC: Sudden onset dizziness  Chart was reviewed in detail, patient was seen and assessed. She is resting in bed,  is at bedside today. Vertigo has fully resolved today. MRI brain was completed with no evidence for stroke. Did discuss treatment of vertigo with the patient and her  should this return. She would be able to take meclizine 25 mg OTC every 8 hours as needed for vertigo. Also recommend if symptoms return that she should have vestibular therapy outpatient. Patient understands and will follow-up with our office should the symptoms return. Past Medical History:   Diagnosis Date    Asthma     Basal cell carcinoma 04/15/2021    History of echocardiogram 2021    EF 55-60%, E/A reversal indterminate diastolic function, no significant valvular abnormalities, no pericardial effusion    History of exercise stress test 2021    Treadmill, normal    Muscle spasm 2021    Shortness of breath 2021    :   Past Surgical History:   Procedure Laterality Date    CHOLECYSTECTOMY      HYSTERECTOMY (CERVIX STATUS UNKNOWN)      OVARY REMOVAL      age 39     Medications:  Scheduled Meds:      Continuous Infusions:      PRN Meds:.     Allergies   Allergen Reactions    Aleve [Naproxen] Hives     Social History     Socioeconomic History    Marital status:      Spouse name: Not on file    Number of children: Not on file    Years of education: Not on file    Highest education level: Not on file   Occupational History    Not on file   Tobacco Use    Smoking status: Never    Smokeless tobacco: Never   Vaping Use    Vaping Use: Never used   Substance and Sexual Activity    Alcohol use: Not Currently     Alcohol/week: 0.0 standard drinks of alcohol    Drug use: Never    Sexual activity: Yes     Partners: Male   Other Topics Concern    Not on file

## 2023-09-22 NOTE — CARE COORDINATION
CM in to see Pt to follow up on discharge planning. Pt not agreeable with OT recommendation of home health care. Pt denies any needs at this time. Discharge plan remains home with spouse.

## 2023-09-23 NOTE — PROGRESS NOTES
Physical Therapy  Facility/Department: [id number] [place] [address]  Physical Therapy Initial Assessment    Name: Juve Nails  : 1955  MRN: [id number]  Date of Service: 2023    Discharge Recommendations:  Outpatient PT, 24 hour supervision or assist        Functional Outcome Measure:    Acute Care Index of Function (ACIF):    Score: 0.94 (0.71 or greater = appropriate for home with OP PT and 24 hour supervision/assist)        Patient Diagnosis(es): The encounter diagnosis was Dizziness. Past Medical History:  has a past medical history of Asthma, Basal cell carcinoma, History of echocardiogram, History of exercise stress test, Muscle spasm, and Shortness of breath. Past Surgical History:  has a past surgical history that includes Cholecystectomy; Hysterectomy; and Ovary removal.    Assessment   Body Structures, Functions, Activity Limitations Requiring Skilled Therapeutic Intervention: Decreased high-level IADLs;Decreased endurance;Decreased balance  Therapy Prognosis: Good  Decision Making: Medium Complexity  Clinical Presentation: evolving  Requires PT Follow-Up: Yes  Activity Tolerance  Activity Tolerance: Patient tolerated evaluation without incident     Plan   Physcial Therapy Plan  General Plan: 3-5 times per week  Current Treatment Recommendations: Strengthening, ROM, Balance training, Functional mobility training, Transfer training, ADL/Self-care training, IADL training, Endurance training, Equipment evaluation, education, & procurement, Pain management, Gait training, Stair training, Neuromuscular re-education, Home exercise program, Positioning, Safety education & training, Therapeutic activities, Patient/Caregiver education & training  Safety Devices  Type of Devices:  All fall risk precautions in place, Bed alarm in place, Call light within reach, Left in bed, Gait belt, Nurse notified  Restraints  Restraints Initially in Place: No     Restrictions  Restrictions/Precautions  Restrictions/Precautions:

## 2023-09-25 ENCOUNTER — TELEPHONE (OUTPATIENT)
Dept: FAMILY MEDICINE CLINIC | Age: 68
End: 2023-09-25

## 2023-09-25 NOTE — TELEPHONE ENCOUNTER
[address] [facility],[address] Floor Transitions Initial Follow Up Call    Call within 2 business days of discharge: Yes    Patient: Martin Lui Patient : 1955   MRN: [id number]  Reason for Admission: Dizziness  Discharge Date: 23 RARS: Readmission Risk Score: 3.4       Spoke with: Client    Discharge department/facility: [address] [place] [address]    Non-face-to-face services provided:  Appointment scheduled with PCP for 10/3/23 @ 10:00am     Follow Up  Future Appointments   Date Time Provider [address]   2023  9:30 AM NORA Segal - CNP [facility]   10/3/2023 10:00 AM Charisma Velasquez PA-C [facility] FMMILLYS [facility]   3/11/2024  9:00 AM Charisma Velasquez PA-C MARY KAY MAE [facility]   2024  9:00 AM YAMILET Blanca [facility]       Enrique Ortiz LPN

## 2023-09-26 NOTE — TELEPHONE ENCOUNTER
[address] [facility],[address] Floor Transitions Initial Follow Up Call    Call within 2 business days of discharge: Yes    Patient: Kenton Zelaya Patient : 1955   MRN: [id number]  Reason for Admission: Dizziness  Discharge Date: 23 RARS: Readmission Risk Score: 3.4       Spoke with: Client    Discharge department/facility: [address] [place] [address]    Non-face-to-face services provided:  Appointment scheduled with PCP for 10/3/23 @ 10:00am     Follow Up  Future Appointments   Date Time Provider [address]   2023  9:30 AM Adriane Russo APRN - CNP [facility]   10/3/2023 10:00 AM Eva Luna PA-C MARY KAY [facility]   3/11/2024  9:00 AM YAMILET Pandya [facility]   2024  9:00 AM YAMILET Pandya [facility]       Rosio Patel LPN

## 2023-09-27 ENCOUNTER — OFFICE VISIT (OUTPATIENT)
Dept: BARIATRICS/WEIGHT MGMT | Age: 68
End: 2023-09-27
Payer: MEDICARE

## 2023-09-27 VITALS
DIASTOLIC BLOOD PRESSURE: 80 MMHG | WEIGHT: 187.9 LBS | SYSTOLIC BLOOD PRESSURE: 122 MMHG | HEART RATE: 76 BPM | OXYGEN SATURATION: 100 % | HEIGHT: 64 IN | BODY MASS INDEX: 32.08 KG/M2

## 2023-09-27 DIAGNOSIS — E66.9 OBESITY (BMI 30-39.9): Primary | ICD-10-CM

## 2023-09-27 DIAGNOSIS — Z79.899 MEDICATION MANAGEMENT: ICD-10-CM

## 2023-09-27 PROCEDURE — 99214 OFFICE O/P EST MOD 30 MIN: CPT | Performed by: NURSE PRACTITIONER

## 2023-09-27 PROCEDURE — 1123F ACP DISCUSS/DSCN MKR DOCD: CPT | Performed by: NURSE PRACTITIONER

## 2023-09-27 RX ORDER — PHENTERMINE HYDROCHLORIDE 37.5 MG/1
37.5 TABLET ORAL
Qty: 30 TABLET | Refills: 0 | Status: SHIPPED | OUTPATIENT
Start: 2023-09-27 | End: 2023-10-27

## 2023-10-12 PROBLEM — Z12.31 ENCOUNTER FOR SCREENING MAMMOGRAM FOR BREAST CANCER: Status: RESOLVED | Noted: 2023-09-12 | Resolved: 2023-10-12

## 2023-10-12 PROBLEM — Z00.00 MEDICARE ANNUAL WELLNESS VISIT, SUBSEQUENT: Status: RESOLVED | Noted: 2023-09-12 | Resolved: 2023-10-12

## 2023-10-20 ENCOUNTER — OFFICE VISIT (OUTPATIENT)
Dept: FAMILY MEDICINE CLINIC | Age: 68
End: 2023-10-20

## 2023-10-20 VITALS
BODY MASS INDEX: 32.37 KG/M2 | DIASTOLIC BLOOD PRESSURE: 70 MMHG | OXYGEN SATURATION: 98 % | WEIGHT: 188.6 LBS | RESPIRATION RATE: 20 BRPM | SYSTOLIC BLOOD PRESSURE: 130 MMHG | HEART RATE: 76 BPM

## 2023-10-20 DIAGNOSIS — Z09 HOSPITAL DISCHARGE FOLLOW-UP: Primary | ICD-10-CM

## 2023-10-20 DIAGNOSIS — E78.2 MIXED HYPERLIPIDEMIA: ICD-10-CM

## 2023-10-20 PROBLEM — H69.93 DYSFUNCTION OF BOTH EUSTACHIAN TUBES: Status: RESOLVED | Noted: 2023-03-16 | Resolved: 2023-10-20

## 2023-10-20 PROBLEM — I99.8 ISCHEMIC VASCULAR DISEASE: Status: ACTIVE | Noted: 2023-10-20

## 2023-10-20 ASSESSMENT — PATIENT HEALTH QUESTIONNAIRE - PHQ9
SUM OF ALL RESPONSES TO PHQ9 QUESTIONS 1 & 2: 0
SUM OF ALL RESPONSES TO PHQ QUESTIONS 1-9: 0
2. FEELING DOWN, DEPRESSED OR HOPELESS: 0
SUM OF ALL RESPONSES TO PHQ QUESTIONS 1-9: 0
1. LITTLE INTEREST OR PLEASURE IN DOING THINGS: 0

## 2023-10-20 ASSESSMENT — ENCOUNTER SYMPTOMS: GASTROINTESTINAL NEGATIVE: 1

## 2023-10-20 NOTE — PATIENT INSTRUCTIONS
We are committed to providing you the best care possible. If you receive a survey after visiting one of our offices, please take time to share your experience concerning your physician office visit. These surveys are confidential and no health information about you is shared. We are eager to improve for you and continue to give you satisfactory care, we are counting on your feedback to help make that happen. Welcome to [address] [place] [address] and Pediatrics:    Did you know we now have a faster way for you to move through your appointment? For your convenience, we now have digital registration available. When you schedule your next appointment, you will receive a link via your email as well as a text message that will allow you to complete any paperwork digitally before your appointment. We are committed to providing you the best care possible. If you receive a survey after visiting one of our offices, please take time to share your experience concerning your physician office visit. These surveys are confidential and no health information about you is shared. We are eager to improve for you and continue to give you satisfactory care, we are counting on your feedback to help make that happen.

## 2023-10-20 NOTE — PROGRESS NOTES
Post-Discharge Transitional Care Follow Up      Makayla Sood   YOB: 1955    Date of Office Visit:  10/20/2023  Date of Hospital Admission: 9/20/23  Date of Hospital Discharge: 9/22/23  Readmission Risk Score (high >=14%. Medium >=10%):Readmission Risk Score: 3.4      Care management risk score Rising risk (score 2-5) and Complex Care (Scores >=6): No Risk Score On File     Non face to face  following discharge, date last encounter closed (first attempt may have been earlier): *No documented post hospital discharge outreach found in the last 14 days     Call initiated 2 business days of discharge: *No response recorded in the last 14 days     Hospital discharge follow-up  Assessment & Plan:  Reviewed labs/MRI/CT etc.. with pt. Due to noted microvascular ischemic dz on MRI, pt advised to continue low dose ASA. Risks/benefits/SE reviewed, pt voices understanding  Discussed keeping BP in good control, low cholesterol, healthy diet/exercise/wt loss. Will recheck lipids. Orders:  -     AL DISCHARGE MEDS RECONCILED W/ CURRENT OUTPATIENT MED LIST  Mixed hyperlipidemia  -     Lipid, Fasting; Future      Medical Decision Making: moderate complexity  Return in 3 months (on 1/20/2024). Subjective:   HPIPt here for post hospital f/u. Inpatient course: Discharge summary reviewed- see chart. Interval history/Current status: pt states she has had no further dizziness so did not call for neurology f/u. Has some questions regarding ASA use-not sure if she should take it as advised at time of discharge.      Patient Active Problem List   Diagnosis    Mild intermittent asthma without complication    Facial lesion    Other fatigue    Abnormal ECG    Palpitations    BMI 31.0-31.9,adult    Vitamin D deficiency    Prediabetes    Age-related osteoporosis without current pathological fracture    Mixed hyperlipidemia    Pain of left heel    Hospital discharge follow-up       Medications listed as

## 2023-10-20 NOTE — ASSESSMENT & PLAN NOTE
Reviewed labs/MRI/CT etc.. with pt. Due to noted microvascular ischemic dz on MRI, pt advised to continue low dose ASA. Risks/benefits/SE reviewed, pt voices understanding  Discussed keeping BP in good control, low cholesterol, healthy diet/exercise/wt loss. Will recheck lipids.

## 2023-11-07 ENCOUNTER — HOSPITAL ENCOUNTER (OUTPATIENT)
Dept: WOMENS IMAGING | Age: 68
Discharge: HOME | End: 2023-11-07
Payer: MEDICARE

## 2023-11-07 DIAGNOSIS — Z12.31 ENCOUNTER FOR SCREENING MAMMOGRAM FOR BREAST CANCER: ICD-10-CM

## 2023-11-07 PROCEDURE — 77063 BREAST TOMOSYNTHESIS BI: CPT

## 2023-11-19 PROBLEM — Z09 HOSPITAL DISCHARGE FOLLOW-UP: Status: RESOLVED | Noted: 2023-10-20 | Resolved: 2023-11-19

## 2023-12-06 ENCOUNTER — OFFICE VISIT (OUTPATIENT)
Dept: FAMILY MEDICINE CLINIC | Age: 68
End: 2023-12-06
Payer: MEDICARE

## 2023-12-06 VITALS
BODY MASS INDEX: 33.27 KG/M2 | SYSTOLIC BLOOD PRESSURE: 124 MMHG | OXYGEN SATURATION: 97 % | DIASTOLIC BLOOD PRESSURE: 72 MMHG | RESPIRATION RATE: 18 BRPM | WEIGHT: 193.8 LBS

## 2023-12-06 DIAGNOSIS — S86.911A KNEE STRAIN, RIGHT, INITIAL ENCOUNTER: Primary | ICD-10-CM

## 2023-12-06 PROCEDURE — 1123F ACP DISCUSS/DSCN MKR DOCD: CPT | Performed by: PHYSICIAN ASSISTANT

## 2023-12-06 PROCEDURE — 99212 OFFICE O/P EST SF 10 MIN: CPT | Performed by: PHYSICIAN ASSISTANT

## 2023-12-06 ASSESSMENT — ENCOUNTER SYMPTOMS
COLOR CHANGE: 0
SHORTNESS OF BREATH: 0

## 2023-12-06 NOTE — PATIENT INSTRUCTIONS
We are committed to providing you the best care possible. If you receive a survey after visiting one of our offices, please take time to share your experience concerning your physician office visit. These surveys are confidential and no health information about you is shared. We are eager to improve for you and continue to give you satisfactory care, we are counting on your feedback to help make that happen. Welcome to [address] [place] [address] and Pediatrics:    Did you know we now have a faster way for you to move through your appointment? For your convenience, we now have digital registration available. When you schedule your next appointment, you will receive a link via your email as well as a text message that will allow you to complete any paperwork digitally before your appointment.

## 2023-12-06 NOTE — PROGRESS NOTES
[facility]  1955  76 y.o.  female    SUBJECTIVE:    Chief Complaint   Patient presents with    Other     Right knee having pain in back of knee . Has swelling and having trouble bending ,   No injury to knee aware off     Flu Vaccine     Declined flu vaccine at this time        HPI  Pt here today for right knee pain. Sudden onset yesterday pain/discomfort while walking through house. As day went on, she noticed some swelling with worsening pain but pt states she was on shopping trip with family so walked around most of day/evening. Pt states today swelling is mostly resolved and pain much better. Present now only with ambulation but resolves with rest. Pain is aching/located along lateral right knee area. No radiation/calf pain/posterior swelling/color or temp changes to limb noted. No swelling in foot/ankle/calf. 12/6/2023     8:34 AM 10/20/2023     4:26 PM 9/11/2023     1:21 PM 8/24/2023     2:11 PM 7/24/2023     2:54 PM 3/16/2023     9:39 AM 11/28/2022    10:46 AM   PHQ Scores   PHQ2 Score 0 0 0 0 0 0 0   PHQ9 Score 0 0 0 0 0 0 0     Interpretation of Total Score Depression Severity: 1-4 = Minimal depression, 5-9 = Mild depression, 10-14 = Moderate depression, 15-19 = Moderately severe depression, 20-27 = Severe depression     Current Outpatient Medications on File Prior to Visit   Medication Sig Dispense Refill    aspirin 81 MG EC tablet Take 1 tablet by mouth daily 90 tablet 1    fluticasone (FLONASE) 50 MCG/ACT nasal spray 1 spray by Each Nostril route daily 1 g 5    albuterol sulfate  (90 Base) MCG/ACT inhaler Inhale 2 puffs into the lungs every 6 hours as needed for Wheezing 1 g 5     No current facility-administered medications on file prior to visit.        Allergies   Allergen Reactions    Aleve [Naproxen] Hives       Past Medical History:   Diagnosis Date    Asthma     Basal cell carcinoma 04/15/2021    Dysfunction of both eustachian tubes 3/16/2023    History of

## 2023-12-06 NOTE — ASSESSMENT & PLAN NOTE
Ice for 15 minutes intermittently for next 48 hours then may use heat/ice for comfort/ace wrap for comfort, elevate when sitting, tylenol/motrin prn pain  F/u if not improving in next few weeks, sooner prn

## 2024-01-23 ENCOUNTER — TELEPHONE (OUTPATIENT)
Dept: FAMILY MEDICINE CLINIC | Age: 69
End: 2024-01-23

## 2024-01-23 NOTE — TELEPHONE ENCOUNTER
Patient called and has cough / congestion / no fever/ wheezing/ no sore throat /no nausea / no vomiting / no diarrhea.  Has been coughing for 2 day and wondering if a antibiotic/ steroid can be called in to pharmacy for her.  Patient states she gets this a couple times of year .  She uses [facility] on [address] in [place].   Please advise

## 2024-01-24 RX ORDER — METHYLPREDNISOLONE 4 MG/1
TABLET ORAL
Qty: 1 KIT | Refills: 0 | Status: SHIPPED | OUTPATIENT
Start: 2024-01-24 | End: 2024-01-30

## 2024-01-24 RX ORDER — BENZONATATE 200 MG/1
200 CAPSULE ORAL 3 TIMES DAILY PRN
Qty: 30 CAPSULE | Refills: 0 | Status: SHIPPED | OUTPATIENT
Start: 2024-01-24 | End: 2024-01-31

## 2024-01-24 NOTE — TELEPHONE ENCOUNTER
Called patient and reviewed providers note with patient .  Patient voices understanding.  No questions or concerns at this time

## 2024-01-29 ENCOUNTER — TELEPHONE (OUTPATIENT)
Dept: FAMILY MEDICINE CLINIC | Age: 69
End: 2024-01-29

## 2024-01-29 NOTE — TELEPHONE ENCOUNTER
Patient called to follow up from illness.   Patient completed medrol dose pack and still has tessilon pearls;   Patient still has dry cough and if brings up phlegm is clear but not often at all.  Patient feels heavy in chest.  Patient states not completely gone .  Does use her inhaler as needed .  Denies sore throat, fever,  nasal congestion, nausea , vomiting and diarrhea .  Patient uses [facility] on [address] in [place] .  Please advise .

## 2024-01-30 RX ORDER — DEXTROMETHORPHAN HYDROBROMIDE AND PROMETHAZINE HYDROCHLORIDE 15; 6.25 MG/5ML; MG/5ML
5 SYRUP ORAL 4 TIMES DAILY PRN
Qty: 240 ML | Refills: 0 | Status: SHIPPED | OUTPATIENT
Start: 2024-01-30 | End: 2024-02-06

## 2024-01-30 NOTE — TELEPHONE ENCOUNTER
Called patient and reviewed note with patient .  Patient unsure and said what ever PCP thinks   Then noticed provider had opening for 1/31/2024 at 11:00 am   Patient took appointment to follow with provider

## 2024-01-30 NOTE — TELEPHONE ENCOUNTER
Called patient and reviewed providers note with patient.   Patient voices understanding.  No questions or concerns at this time

## 2024-02-22 DIAGNOSIS — M25.561 ACUTE PAIN OF RIGHT KNEE: Primary | ICD-10-CM

## 2024-02-26 ENCOUNTER — HOSPITAL ENCOUNTER (OUTPATIENT)
Dept: GENERAL RADIOLOGY | Age: 69
Discharge: HOME | End: 2024-02-26
Payer: MEDICARE

## 2024-02-26 ENCOUNTER — HOSPITAL ENCOUNTER (OUTPATIENT)
Age: 69
Discharge: HOME | End: 2024-02-26
Payer: MEDICARE

## 2024-02-26 DIAGNOSIS — M25.561 ACUTE PAIN OF RIGHT KNEE: ICD-10-CM

## 2024-02-26 PROCEDURE — 73562 X-RAY EXAM OF KNEE 3: CPT

## 2024-03-11 ENCOUNTER — OFFICE VISIT (OUTPATIENT)
Age: 69
End: 2024-03-11
Payer: MEDICARE

## 2024-03-11 VITALS
BODY MASS INDEX: 33.57 KG/M2 | HEART RATE: 75 BPM | SYSTOLIC BLOOD PRESSURE: 126 MMHG | OXYGEN SATURATION: 96 % | RESPIRATION RATE: 20 BRPM | DIASTOLIC BLOOD PRESSURE: 80 MMHG | WEIGHT: 195.6 LBS

## 2024-03-11 DIAGNOSIS — M17.11 PRIMARY OSTEOARTHRITIS OF RIGHT KNEE: ICD-10-CM

## 2024-03-11 DIAGNOSIS — J45.20 MILD INTERMITTENT ASTHMA WITHOUT COMPLICATION: ICD-10-CM

## 2024-03-11 DIAGNOSIS — G89.29 CHRONIC PAIN OF RIGHT KNEE: ICD-10-CM

## 2024-03-11 DIAGNOSIS — M25.561 CHRONIC PAIN OF RIGHT KNEE: ICD-10-CM

## 2024-03-11 DIAGNOSIS — R73.03 PREDIABETES: Primary | ICD-10-CM

## 2024-03-11 LAB — HBA1C MFR BLD: 6.3 %

## 2024-03-11 PROCEDURE — 83036 HEMOGLOBIN GLYCOSYLATED A1C: CPT | Performed by: PHYSICIAN ASSISTANT

## 2024-03-11 PROCEDURE — 1123F ACP DISCUSS/DSCN MKR DOCD: CPT | Performed by: PHYSICIAN ASSISTANT

## 2024-03-11 PROCEDURE — 99214 OFFICE O/P EST MOD 30 MIN: CPT | Performed by: PHYSICIAN ASSISTANT

## 2024-03-11 RX ORDER — ALBUTEROL SULFATE 90 UG/1
2 AEROSOL, METERED RESPIRATORY (INHALATION) EVERY 6 HOURS PRN
Qty: 1 G | Refills: 5 | Status: SHIPPED | OUTPATIENT
Start: 2024-03-11

## 2024-03-11 ASSESSMENT — PATIENT HEALTH QUESTIONNAIRE - PHQ9
1. LITTLE INTEREST OR PLEASURE IN DOING THINGS: 0
2. FEELING DOWN, DEPRESSED OR HOPELESS: 0
SUM OF ALL RESPONSES TO PHQ QUESTIONS 1-9: 0
SUM OF ALL RESPONSES TO PHQ9 QUESTIONS 1 & 2: 0

## 2024-03-11 NOTE — PROGRESS NOTES
Orthopedic Surgery, [place]            Return in about 5 months (around 8/11/2024) for medicare wellness.

## 2024-03-13 ASSESSMENT — ENCOUNTER SYMPTOMS: RESPIRATORY NEGATIVE: 1

## 2024-03-13 NOTE — ASSESSMENT & PLAN NOTE
The patient is asked to make an attempt to improve diet and exercise patterns to aid in medical management of this problem.

## 2024-04-01 ENCOUNTER — OFFICE VISIT (OUTPATIENT)
Dept: ORTHOPEDIC SURGERY | Age: 69
End: 2024-04-01
Payer: MEDICARE

## 2024-04-01 VITALS — OXYGEN SATURATION: 96 % | BODY MASS INDEX: 34.08 KG/M2 | HEART RATE: 74 BPM | WEIGHT: 199.6 LBS | HEIGHT: 64 IN

## 2024-04-01 DIAGNOSIS — M24.561 FLEXION CONTRACTURE OF KNEE, RIGHT: Primary | ICD-10-CM

## 2024-04-01 PROCEDURE — 99203 OFFICE O/P NEW LOW 30 MIN: CPT | Performed by: STUDENT IN AN ORGANIZED HEALTH CARE EDUCATION/TRAINING PROGRAM

## 2024-04-01 PROCEDURE — 1123F ACP DISCUSS/DSCN MKR DOCD: CPT | Performed by: STUDENT IN AN ORGANIZED HEALTH CARE EDUCATION/TRAINING PROGRAM

## 2024-04-01 RX ORDER — CYCLOBENZAPRINE HCL 5 MG
5 TABLET ORAL 2 TIMES DAILY PRN
Qty: 30 TABLET | Refills: 0 | Status: SHIPPED | OUTPATIENT
Start: 2024-04-01 | End: 2024-04-11

## 2024-04-01 ASSESSMENT — ENCOUNTER SYMPTOMS
WHEEZING: 0
SHORTNESS OF BREATH: 0
BACK PAIN: 0
VOMITING: 0
VOICE CHANGE: 0
NAUSEA: 0
COUGH: 0
SORE THROAT: 0
COLOR CHANGE: 0

## 2024-04-01 NOTE — PROGRESS NOTES
Patient is a 69 y.o. year old female. Patient is in the office today with RT knee pain. Patient states that she injured themselves by falling and hitting the lateral portion of her knee on concrete in Sept of 2023. She state that the pain lasted for a day. She report the pain started again about 3 months ago. Pain scale  5/10. Getting up from a seated position increases her pain. She states the her knee feels swollen and warm. Her pain is located n the lateral portion of her knee. She was seen by her PCP. X-rays were taken on 2/26/24  Occupation:  Dominant Hand:   Medication Refill:    IMPRESSION:  1. Mild medial and patellofemoral compartment degenerative changes.  2. Trace joint effusion.  3. No acute osseous abnormality.  
aspect of the knee including the hamstrings.  She has been resting with her knee flexed and pillow and does admit to a crouched knee gait when she originally hurt the knee.  I explained that her issues appear to be related to a flexion contracture that she is developed the back of the knee and at this time she will rest with nothing underneath the knee and the heel up in order to force the knee in full extension using gravity as well as ice.  She was provided with an Ace wrap today to help with this as well.  She was also given home exercise program to work on posterior capsular stretching of the knee.  She was also given a Flexeril prescription.  If she develops any true joint line pain or patellofemoral pain I would be happy to provide her cortisone injection and she will call if she wants this especially with her upcoming vacation in the next few weeks.  Otherwise, patient be seen as needed.  All questions were answered and patient and her  voiced understanding    Electronically signed by Robles Anthony DO on 4/1/2024 at 9:18 AM

## 2024-04-01 NOTE — PATIENT INSTRUCTIONS
Work on knee extension/ straightening of the knee.  Flexeril was sent to your pharmacy.  Call our office if you decide on an injection  Follow up as needed.     [email or web address]  Access Code: [email or web address]

## 2024-04-24 ENCOUNTER — HOSPITAL ENCOUNTER (EMERGENCY)
Age: 69
Discharge: HOME | End: 2024-04-24
Attending: EMERGENCY MEDICINE
Payer: MEDICARE

## 2024-04-24 VITALS
WEIGHT: 200 LBS | HEIGHT: 64 IN | HEART RATE: 67 BPM | SYSTOLIC BLOOD PRESSURE: 165 MMHG | DIASTOLIC BLOOD PRESSURE: 77 MMHG | BODY MASS INDEX: 34.15 KG/M2 | OXYGEN SATURATION: 95 % | TEMPERATURE: 97.8 F

## 2024-04-24 DIAGNOSIS — H57.89 EYE IRRITATION: Primary | ICD-10-CM

## 2024-04-24 PROCEDURE — 6370000000 HC RX 637 (ALT 250 FOR IP): Performed by: EMERGENCY MEDICINE

## 2024-04-24 PROCEDURE — 99283 EMERGENCY DEPT VISIT LOW MDM: CPT

## 2024-04-24 RX ORDER — POLYMYXIN B SULFATE AND TRIMETHOPRIM 1; 10000 MG/ML; [USP'U]/ML
1 SOLUTION OPHTHALMIC EVERY 4 HOURS
Qty: 3 ML | Refills: 0 | Status: SHIPPED | OUTPATIENT
Start: 2024-04-24 | End: 2024-05-04

## 2024-04-24 RX ORDER — TETRACAINE HYDROCHLORIDE 5 MG/ML
2 SOLUTION OPHTHALMIC ONCE
Status: COMPLETED | OUTPATIENT
Start: 2024-04-24 | End: 2024-04-24

## 2024-04-24 RX ADMIN — TETRACAINE HYDROCHLORIDE 2 DROP: 5 SOLUTION OPHTHALMIC at 13:12

## 2024-04-24 RX ADMIN — FLUORESCEIN SODIUM 1 MG: 1 STRIP OPHTHALMIC at 13:12

## 2024-04-24 ASSESSMENT — VISUAL ACUITY
OU: 20/15
OD: 20/20
OS: 20/20

## 2024-04-24 NOTE — ED PROVIDER NOTES
on file   Intimate Partner Violence: Not on file   Housing Stability: Unknown (3/16/2023)    Housing Stability Vital Sign     Unable to Pay for Housing in the Last Year: Not on file     Number of Places Lived in the Last Year: Not on file     Unstable Housing in the Last Year: No     No current facility-administered medications for this encounter.     Current Outpatient Medications   Medication Sig Dispense Refill    trimethoprim-polymyxin b (POLYTRIM) 13795-7.1 UNIT/ML-% ophthalmic solution Place 1 drop into the left eye every 4 hours for 10 days 3 mL 0    albuterol sulfate HFA (PROVENTIL;VENTOLIN;PROAIR) 108 (90 Base) MCG/ACT inhaler Inhale 2 puffs into the lungs every 6 hours as needed for Wheezing 1 g 5    aspirin 81 MG EC tablet Take 1 tablet by mouth daily 90 tablet 1    fluticasone (FLONASE) 50 MCG/ACT nasal spray 1 spray by Each Nostril route daily 1 g 5     Allergies   Allergen Reactions    Aleve [Naproxen] Hives       Nursing Notes Reviewed    I have reviewed and interpreted all of the currently available lab results from this visit (if applicable):  No results found for this visit on 04/24/24.   Radiographs (if obtained):  Radiologist's Report Reviewed:  No orders to display           Comment: Please note this report has been produced using speech recognition software and may contain errors related to that system including errors in grammar, punctuation, and spelling, as well as words and phrases that may be inappropriate.  Efforts were made to edit the dictations.        Lex Graham MD  04/24/24 9061

## 2024-11-19 SDOH — ECONOMIC STABILITY: FOOD INSECURITY: WITHIN THE PAST 12 MONTHS, THE FOOD YOU BOUGHT JUST DIDN'T LAST AND YOU DIDN'T HAVE MONEY TO GET MORE.: NEVER TRUE

## 2024-11-19 SDOH — ECONOMIC STABILITY: TRANSPORTATION INSECURITY
IN THE PAST 12 MONTHS, HAS LACK OF TRANSPORTATION KEPT YOU FROM MEETINGS, WORK, OR FROM GETTING THINGS NEEDED FOR DAILY LIVING?: NO

## 2024-11-19 SDOH — ECONOMIC STABILITY: FOOD INSECURITY: WITHIN THE PAST 12 MONTHS, YOU WORRIED THAT YOUR FOOD WOULD RUN OUT BEFORE YOU GOT MONEY TO BUY MORE.: NEVER TRUE

## 2024-11-19 SDOH — ECONOMIC STABILITY: INCOME INSECURITY: HOW HARD IS IT FOR YOU TO PAY FOR THE VERY BASICS LIKE FOOD, HOUSING, MEDICAL CARE, AND HEATING?: NOT HARD AT ALL

## 2024-11-19 SDOH — HEALTH STABILITY: PHYSICAL HEALTH: ON AVERAGE, HOW MANY DAYS PER WEEK DO YOU ENGAGE IN MODERATE TO STRENUOUS EXERCISE (LIKE A BRISK WALK)?: 0 DAYS

## 2024-11-19 ASSESSMENT — LIFESTYLE VARIABLES
HOW OFTEN DO YOU HAVE SIX OR MORE DRINKS ON ONE OCCASION: 1
HOW MANY STANDARD DRINKS CONTAINING ALCOHOL DO YOU HAVE ON A TYPICAL DAY: PATIENT DOES NOT DRINK
HOW OFTEN DO YOU HAVE A DRINK CONTAINING ALCOHOL: NEVER
HOW MANY STANDARD DRINKS CONTAINING ALCOHOL DO YOU HAVE ON A TYPICAL DAY: 0
HOW OFTEN DO YOU HAVE A DRINK CONTAINING ALCOHOL: 1

## 2024-11-19 ASSESSMENT — PATIENT HEALTH QUESTIONNAIRE - PHQ9
SUM OF ALL RESPONSES TO PHQ9 QUESTIONS 1 & 2: 0
SUM OF ALL RESPONSES TO PHQ QUESTIONS 1-9: 0
SUM OF ALL RESPONSES TO PHQ QUESTIONS 1-9: 0
2. FEELING DOWN, DEPRESSED OR HOPELESS: NOT AT ALL
SUM OF ALL RESPONSES TO PHQ QUESTIONS 1-9: 0
SUM OF ALL RESPONSES TO PHQ QUESTIONS 1-9: 0
1. LITTLE INTEREST OR PLEASURE IN DOING THINGS: NOT AT ALL

## 2024-11-20 ENCOUNTER — OFFICE VISIT (OUTPATIENT)
Age: 69
End: 2024-11-20
Payer: MEDICARE

## 2024-11-20 VITALS
HEART RATE: 72 BPM | DIASTOLIC BLOOD PRESSURE: 84 MMHG | RESPIRATION RATE: 16 BRPM | OXYGEN SATURATION: 97 % | WEIGHT: 207 LBS | HEIGHT: 64 IN | SYSTOLIC BLOOD PRESSURE: 136 MMHG | BODY MASS INDEX: 35.34 KG/M2

## 2024-11-20 DIAGNOSIS — R73.03 PREDIABETES: ICD-10-CM

## 2024-11-20 DIAGNOSIS — Z00.00 MEDICARE ANNUAL WELLNESS VISIT, SUBSEQUENT: Primary | ICD-10-CM

## 2024-11-20 PROBLEM — M79.672 PAIN OF LEFT HEEL: Status: RESOLVED | Noted: 2022-03-04 | Resolved: 2024-11-20

## 2024-11-20 PROBLEM — L98.9 FACIAL LESION: Status: RESOLVED | Noted: 2021-01-18 | Resolved: 2024-11-20

## 2024-11-20 PROBLEM — R94.31 ABNORMAL ECG: Status: RESOLVED | Noted: 2021-01-18 | Resolved: 2024-11-20

## 2024-11-20 PROBLEM — R53.83 OTHER FATIGUE: Status: RESOLVED | Noted: 2021-01-18 | Resolved: 2024-11-20

## 2024-11-20 PROBLEM — R00.2 PALPITATIONS: Status: RESOLVED | Noted: 2021-01-27 | Resolved: 2024-11-20

## 2024-11-20 PROCEDURE — 1159F MED LIST DOCD IN RCRD: CPT

## 2024-11-20 PROCEDURE — G0439 PPPS, SUBSEQ VISIT: HCPCS

## 2024-11-20 PROCEDURE — 1123F ACP DISCUSS/DSCN MKR DOCD: CPT

## 2024-11-20 PROCEDURE — 36415 COLL VENOUS BLD VENIPUNCTURE: CPT

## 2024-11-20 NOTE — PROGRESS NOTES
Medicare Annual Wellness Visit    Miri Cordero is here for Medicare AWV (To establish care with provider )    Assessment & Plan   Medicare annual wellness visit, subsequent  -     Comprehensive Metabolic Panel; Future  -     Lipid, Fasting; Future  -     TSH with Reflex to FT4 ([place] Only); Future  Prediabetes  -     Hemoglobin A1C; Future  Recommendations for Preventive Services Due: see orders and patient instructions/AVS.  Recommended screening schedule for the next 5-10 years is provided to the patient in written form: see Patient Instructions/AVS.     Return in 6 months (on 5/20/2025).     Subjective       Patient's complete Health Risk Assessment and screening values have been reviewed and are found in Flowsheets. The following problems were reviewed today and where indicated follow up appointments were made and/or referrals ordered.    Positive Risk Factor Screenings with Interventions:              Inactivity:  On average, how many days per week do you engage in moderate to strenuous exercise (like a brisk walk)?: 0 days (!) Abnormal     Interventions:  See AVS for additional education material    Poor Eating Habits/Diet:  Do you eat balanced/healthy meals regularly?: (!) No  Interventions:  See AVS for additional education material    Abnormal BMI (obese):  Body mass index is 35.53 kg/m². (!) Abnormal  Interventions:  See AVS for additional education material        Dentist Screen:  Have you seen the dentist within the past year?: (!) No    Intervention:  Advised to schedule with their dentist     Vision Screen:  Do you have difficulty driving, watching TV, or doing any of your daily activities because of your eyesight?: No  Have you had an eye exam within the past year?: (!) No  Interventions:   Patient encouraged to make appointment with their eye specialist      Advanced Directives:  Do you have a Living Will?: (!) No    Intervention:  has NO advanced directive - information provided

## 2024-11-20 NOTE — PATIENT INSTRUCTIONS
you have questions about a medical condition or this instruction, always ask your healthcare professional. [employer], [facility] disclaims any warranty or liability for your use of this information.      Personalized Preventive Plan for Miri Cordero - 11/20/2024  Medicare offers a range of preventive health benefits. Some of the tests and screenings are paid in full while other may be subject to a deductible, co-insurance, and/or copay.    Some of these benefits include a comprehensive review of your medical history including lifestyle, illnesses that may run in your family, and various assessments and screenings as appropriate.    After reviewing your medical record and screening and assessments performed today your provider may have ordered immunizations, labs, imaging, and/or referrals for you.  A list of these orders (if applicable) as well as your Preventive Care list are included within your After Visit Summary for your review.    Other Preventive Recommendations:    A preventive eye exam performed by an eye specialist is recommended every 1-2 years to screen for glaucoma; cataracts, macular degeneration, and other eye disorders.  A preventive dental visit is recommended every 6 months.  Try to get at least 150 minutes of exercise per week or 10,000 steps per day on a pedometer .  Order or download the FREE \"Exercise & Physical Activity: Your Everyday Guide\" from [employer]. Call 1-627.551.1047 or search [employer] online.  You need 5844-0530 mg of calcium and 6553-4828 IU of vitamin D per day. It is possible to meet your calcium requirement with diet alone, but a vitamin D supplement is usually necessary to meet this goal.  When exposed to the sun, use a sunscreen that protects against both UVA and UVB radiation with an SPF of 30 or greater. Reapply every 2 to 3 hours or after sweating, drying off with a towel, or swimming.  Always wear a seat belt when

## 2024-11-21 ENCOUNTER — PATIENT MESSAGE (OUTPATIENT)
Age: 69
End: 2024-11-21

## 2024-11-21 LAB
ALBUMIN SERPL-MCNC: 4.4 G/DL (ref 3.4–5)
ALBUMIN/GLOB SERPL: 1.9 {RATIO} (ref 1.1–2.2)
ALP SERPL-CCNC: 76 U/L (ref 40–129)
ALT SERPL-CCNC: 23 U/L (ref 10–40)
ANION GAP SERPL CALCULATED.3IONS-SCNC: 13 MMOL/L (ref 3–16)
AST SERPL-CCNC: 24 U/L (ref 15–37)
BILIRUB SERPL-MCNC: <0.2 MG/DL (ref 0–1)
BUN SERPL-MCNC: 17 MG/DL (ref 7–20)
CALCIUM SERPL-MCNC: 9.2 MG/DL (ref 8.3–10.6)
CHLORIDE SERPL-SCNC: 105 MMOL/L (ref 99–110)
CHOLEST SERPL-MCNC: 242 MG/DL (ref 0–199)
CO2 SERPL-SCNC: 23 MMOL/L (ref 21–32)
CREAT SERPL-MCNC: 0.7 MG/DL (ref 0.6–1.2)
EST. AVERAGE GLUCOSE BLD GHB EST-MCNC: 137 MG/DL
GFR SERPLBLD CREATININE-BSD FMLA CKD-EPI: >90 ML/MIN/{1.73_M2}
GLUCOSE SERPL-MCNC: 131 MG/DL (ref 70–99)
HBA1C MFR BLD: 6.4 %
HDLC SERPL-MCNC: 48 MG/DL (ref 40–60)
LDL CHOLESTEROL: 161 MG/DL
POTASSIUM SERPL-SCNC: 4.9 MMOL/L (ref 3.5–5.1)
PROT SERPL-MCNC: 6.7 G/DL (ref 6.4–8.2)
SODIUM SERPL-SCNC: 141 MMOL/L (ref 136–145)
TRIGL SERPL-MCNC: 163 MG/DL (ref 0–150)
TSH SERPL DL<=0.005 MIU/L-ACNC: 3.68 UIU/ML (ref 0.27–4.2)
VLDLC SERPL CALC-MCNC: 33 MG/DL

## 2025-03-11 ENCOUNTER — COMMUNITY OUTREACH (OUTPATIENT)
Age: 70
End: 2025-03-11

## 2025-08-23 ENCOUNTER — APPOINTMENT (OUTPATIENT)
Dept: GENERAL RADIOLOGY | Age: 70
End: 2025-08-23
Payer: MEDICARE

## 2025-08-23 ENCOUNTER — HOSPITAL ENCOUNTER (EMERGENCY)
Age: 70
Discharge: HOME | End: 2025-08-23
Attending: EMERGENCY MEDICINE
Payer: MEDICARE

## 2025-08-23 VITALS
DIASTOLIC BLOOD PRESSURE: 62 MMHG | RESPIRATION RATE: 18 BRPM | TEMPERATURE: 98 F | HEIGHT: 64 IN | WEIGHT: 205 LBS | SYSTOLIC BLOOD PRESSURE: 193 MMHG | HEART RATE: 81 BPM | BODY MASS INDEX: 35 KG/M2 | OXYGEN SATURATION: 97 %

## 2025-08-23 DIAGNOSIS — J45.20 MILD INTERMITTENT ASTHMATIC BRONCHITIS WITHOUT COMPLICATION: Primary | ICD-10-CM

## 2025-08-23 PROCEDURE — 6370000000 HC RX 637 (ALT 250 FOR IP): Performed by: EMERGENCY MEDICINE

## 2025-08-23 PROCEDURE — 6360000002 HC RX W HCPCS: Performed by: EMERGENCY MEDICINE

## 2025-08-23 PROCEDURE — 99283 EMERGENCY DEPT VISIT LOW MDM: CPT

## 2025-08-23 PROCEDURE — 71045 X-RAY EXAM CHEST 1 VIEW: CPT

## 2025-08-23 RX ORDER — GUAIFENESIN AND DEXTROMETHORPHAN HYDROBROMIDE 1200; 60 MG/1; MG/1
1 TABLET, EXTENDED RELEASE ORAL 2 TIMES DAILY PRN
Qty: 30 TABLET | Refills: 0 | Status: SHIPPED | OUTPATIENT
Start: 2025-08-23

## 2025-08-23 RX ORDER — AZITHROMYCIN 500 MG/1
500 TABLET, FILM COATED ORAL DAILY
Qty: 5 TABLET | Refills: 0 | Status: SHIPPED | OUTPATIENT
Start: 2025-08-23 | End: 2025-08-28

## 2025-08-23 RX ORDER — DEXAMETHASONE 4 MG/1
4 TABLET ORAL
Qty: 15 TABLET | Refills: 0 | Status: SHIPPED | OUTPATIENT
Start: 2025-08-23 | End: 2025-08-28

## 2025-08-23 RX ORDER — DEXAMETHASONE 4 MG/1
8 TABLET ORAL ONCE
Status: COMPLETED | OUTPATIENT
Start: 2025-08-23 | End: 2025-08-23

## 2025-08-23 RX ORDER — IPRATROPIUM BROMIDE AND ALBUTEROL SULFATE 2.5; .5 MG/3ML; MG/3ML
1 SOLUTION RESPIRATORY (INHALATION) ONCE
Status: COMPLETED | OUTPATIENT
Start: 2025-08-23 | End: 2025-08-23

## 2025-08-23 RX ADMIN — DEXAMETHASONE 8 MG: 4 TABLET ORAL at 16:38

## 2025-08-23 RX ADMIN — GUAIFENESIN, DEXTROMETHORPHAN HBR 2 TABLET: 600; 30 TABLET ORAL at 16:38

## 2025-08-23 RX ADMIN — IPRATROPIUM BROMIDE AND ALBUTEROL SULFATE 1 DOSE: .5; 2.5 SOLUTION RESPIRATORY (INHALATION) at 16:38

## 2025-08-23 ASSESSMENT — PAIN - FUNCTIONAL ASSESSMENT: PAIN_FUNCTIONAL_ASSESSMENT: 0-10

## 2025-08-23 ASSESSMENT — PAIN SCALES - GENERAL
PAINLEVEL_OUTOF10: 0
PAINLEVEL_OUTOF10: 0